# Patient Record
Sex: MALE | Race: WHITE | NOT HISPANIC OR LATINO | Employment: OTHER | URBAN - NONMETROPOLITAN AREA
[De-identification: names, ages, dates, MRNs, and addresses within clinical notes are randomized per-mention and may not be internally consistent; named-entity substitution may affect disease eponyms.]

---

## 2017-02-23 ENCOUNTER — OFFICE VISIT (OUTPATIENT)
Dept: FAMILY MEDICINE CLINIC | Facility: CLINIC | Age: 62
End: 2017-02-23

## 2017-02-23 VITALS
DIASTOLIC BLOOD PRESSURE: 68 MMHG | OXYGEN SATURATION: 96 % | WEIGHT: 223 LBS | HEIGHT: 73 IN | RESPIRATION RATE: 16 BRPM | BODY MASS INDEX: 29.55 KG/M2 | HEART RATE: 79 BPM | TEMPERATURE: 97.9 F | SYSTOLIC BLOOD PRESSURE: 117 MMHG

## 2017-02-23 DIAGNOSIS — J40 BRONCHITIS: Primary | ICD-10-CM

## 2017-02-23 DIAGNOSIS — L30.0 NUMMULAR ECZEMA: ICD-10-CM

## 2017-02-23 PROCEDURE — 99213 OFFICE O/P EST LOW 20 MIN: CPT | Performed by: INTERNAL MEDICINE

## 2017-02-23 RX ORDER — GUAIFENESIN 600 MG/1
1200 TABLET, EXTENDED RELEASE ORAL 2 TIMES DAILY
Qty: 60 TABLET | Refills: 1 | Status: SHIPPED | OUTPATIENT
Start: 2017-02-23 | End: 2017-06-09

## 2017-02-23 RX ORDER — DOXYCYCLINE 100 MG/1
100 CAPSULE ORAL 2 TIMES DAILY
Qty: 20 CAPSULE | Refills: 0 | Status: SHIPPED | OUTPATIENT
Start: 2017-02-23 | End: 2017-06-09

## 2017-02-23 RX ORDER — BETAMETHASONE DIPROPIONATE 0.5 MG/G
CREAM TOPICAL 2 TIMES DAILY
Qty: 45 G | Refills: 0 | Status: SHIPPED | OUTPATIENT
Start: 2017-02-23 | End: 2017-11-09

## 2017-02-23 NOTE — PROGRESS NOTES
"Subjective   Patient ID: Seng Seymour is a 61 y.o. male Pt is here for management of multiple medical problems.  History of Present Illness  Pt is here for management of multiple medical problems.    Uri started Tuesday and getting worse. Cough and sputum in the past days. Fever and chills. No soa.    No otc.  + sick contacts.      The following portions of the patient's history were reviewed and updated as appropriate: allergies, current medications, past family history, past medical history, past social history, past surgical history and problem list.      Review of Systems   Constitutional: Positive for fatigue and fever.   HENT: Positive for congestion and facial swelling. Negative for sore throat.    Respiratory: Positive for shortness of breath and wheezing.    Cardiovascular: Negative for chest pain.       Objective     Visit Vitals   • /68 (BP Location: Left arm, Patient Position: Sitting, Cuff Size: Large Adult)   • Pulse 79   • Temp 97.9 °F (36.6 °C) (Oral)   • Resp 16   • Ht 73\" (185.4 cm)   • Wt 223 lb (101 kg)   • SpO2 96%   • BMI 29.42 kg/m2     Physical Exam  General Appearance:    Alert, cooperative, no distress, appears stated age   Head:    Normocephalic, without obvious abnormality, atraumatic   Eyes:    PERRL, conjunctiva/corneas clear, EOM's intact           Ears:    Normal TM's and external ear canals, both ears   Nose:   Nares normal, septum midline, mucosa normal, no drainage    or sinus tenderness   Throat:   Lips, mucosa, and tongue normal; teeth and gums normal   Neck:   Supple, symmetrical, trachea midline, no adenopathy;        thyroid:  No enlargement/tenderness/nodules; no carotid    bruit or JVD   Back:     Symmetric, no curvature, ROM normal, no CVA tenderness   Lungs:     Decrease epitory bs with no wheezing   to auscultation bilaterally, respirations unlabored   Chest wall:    No tenderness or deformity   Heart:    Regular rate and rhythm, S1 and S2 normal, no murmur, " rub   or gallop   Abdomen:     Soft, non-tender, bowel sounds active all four quadrants,     no masses, no organomegaly           Extremities:   Extremities normal, atraumatic, no cyanosis or edema   Pulses:   2+ and symmetric all extremities   Skin:  left latteral leg rash    Lymph nodes:   Cervical, supraclavicular, and axillary nodes normal   Neurologic:   CNII-XII intact. Normal strength, sensation and reflexes       throughout       Assessment/Plan           Seng was seen today for cough, fever and nasal congestion.    Diagnoses and all orders for this visit:    Bronchitis    Nummular eczema  -     betamethasone dipropionate (DIPROLENE) 0.05 % cream; Apply  topically 2 (Two) Times a Day.    Other orders  -     doxycycline (MONODOX) 100 MG capsule; Take 1 capsule by mouth 2 (Two) Times a Day.  -     guaiFENesin (MUCINEX) 600 MG 12 hr tablet; Take 2 tablets by mouth 2 (Two) Times a Day.      No Follow-up on file.                   There are no Patient Instructions on file for this visit.

## 2017-06-05 ENCOUNTER — LAB (OUTPATIENT)
Dept: FAMILY MEDICINE CLINIC | Facility: CLINIC | Age: 62
End: 2017-06-05

## 2017-06-05 DIAGNOSIS — I10 ESSENTIAL HYPERTENSION: ICD-10-CM

## 2017-06-05 DIAGNOSIS — D72.828 OTHER ELEVATED WHITE BLOOD CELL (WBC) COUNT: ICD-10-CM

## 2017-06-05 LAB
ALBUMIN SERPL-MCNC: 4.3 G/DL (ref 3.5–5)
ALBUMIN/GLOB SERPL: 1.5 G/DL (ref 1–2)
ALP SERPL-CCNC: 93 U/L (ref 38–126)
ALT SERPL-CCNC: 47 U/L (ref 13–69)
AST SERPL-CCNC: 25 U/L (ref 15–46)
BASOPHILS # BLD AUTO: 0.05 10*3/MM3 (ref 0–0.2)
BASOPHILS NFR BLD AUTO: 0.5 % (ref 0–2.5)
BILIRUB SERPL-MCNC: 0.7 MG/DL (ref 0.2–1.3)
BUN SERPL-MCNC: 19 MG/DL (ref 7–20)
BUN/CREAT SERPL: 17.3 (ref 6.3–21.9)
CALCIUM SERPL-MCNC: 9.5 MG/DL (ref 8.4–10.2)
CHLORIDE SERPL-SCNC: 103 MMOL/L (ref 98–107)
CO2 SERPL-SCNC: 25 MMOL/L (ref 26–30)
CREAT SERPL-MCNC: 1.1 MG/DL (ref 0.6–1.3)
EOSINOPHIL # BLD AUTO: 0.13 10*3/MM3 (ref 0–0.7)
EOSINOPHIL NFR BLD AUTO: 1.3 % (ref 0–7)
ERYTHROCYTE [DISTWIDTH] IN BLOOD BY AUTOMATED COUNT: 13.2 % (ref 11.5–14.5)
GLOBULIN SER CALC-MCNC: 2.9 GM/DL
GLUCOSE SERPL-MCNC: 109 MG/DL (ref 74–98)
HCT VFR BLD AUTO: 47.4 % (ref 42–52)
HGB BLD-MCNC: 16 G/DL (ref 14–18)
IMM GRANULOCYTES # BLD: 0.03 10*3/MM3 (ref 0–0.06)
IMM GRANULOCYTES NFR BLD: 0.3 % (ref 0–0.6)
LYMPHOCYTES # BLD AUTO: 1.89 10*3/MM3 (ref 0.6–3.4)
LYMPHOCYTES NFR BLD AUTO: 18.5 % (ref 10–50)
MCH RBC QN AUTO: 29.1 PG (ref 27–31)
MCHC RBC AUTO-ENTMCNC: 33.8 G/DL (ref 30–37)
MCV RBC AUTO: 86.3 FL (ref 80–94)
MONOCYTES # BLD AUTO: 0.7 10*3/MM3 (ref 0–0.9)
MONOCYTES NFR BLD AUTO: 6.8 % (ref 0–12)
NEUTROPHILS # BLD AUTO: 7.42 10*3/MM3 (ref 2–6.9)
NEUTROPHILS NFR BLD AUTO: 72.6 % (ref 37–80)
NRBC BLD AUTO-RTO: 0 /100 WBC (ref 0–0)
PLATELET # BLD AUTO: 291 10*3/MM3 (ref 130–400)
POTASSIUM SERPL-SCNC: 4.2 MMOL/L (ref 3.5–5.1)
PROT SERPL-MCNC: 7.2 G/DL (ref 6.3–8.2)
RBC # BLD AUTO: 5.49 10*6/MM3 (ref 4.7–6.1)
SODIUM SERPL-SCNC: 142 MMOL/L (ref 137–145)
WBC # BLD AUTO: 10.22 10*3/MM3 (ref 4.8–10.8)

## 2017-06-09 ENCOUNTER — OFFICE VISIT (OUTPATIENT)
Dept: FAMILY MEDICINE CLINIC | Facility: CLINIC | Age: 62
End: 2017-06-09

## 2017-06-09 VITALS
RESPIRATION RATE: 16 BRPM | HEIGHT: 73 IN | SYSTOLIC BLOOD PRESSURE: 117 MMHG | TEMPERATURE: 97.7 F | HEART RATE: 62 BPM | DIASTOLIC BLOOD PRESSURE: 72 MMHG | OXYGEN SATURATION: 97 % | BODY MASS INDEX: 29.95 KG/M2 | WEIGHT: 226 LBS

## 2017-06-09 DIAGNOSIS — G47.09 OTHER INSOMNIA: Primary | ICD-10-CM

## 2017-06-09 DIAGNOSIS — I10 ESSENTIAL HYPERTENSION: ICD-10-CM

## 2017-06-09 PROCEDURE — 99214 OFFICE O/P EST MOD 30 MIN: CPT | Performed by: INTERNAL MEDICINE

## 2017-06-09 RX ORDER — LISINOPRIL 10 MG/1
10 TABLET ORAL DAILY
Qty: 30 TABLET | Refills: 11 | Status: SHIPPED | OUTPATIENT
Start: 2017-06-09 | End: 2018-05-11 | Stop reason: SDUPTHER

## 2017-06-09 RX ORDER — TRAZODONE HYDROCHLORIDE 50 MG/1
50 TABLET ORAL NIGHTLY
Qty: 30 TABLET | Refills: 11 | Status: SHIPPED | OUTPATIENT
Start: 2017-06-09 | End: 2018-12-26 | Stop reason: SDUPTHER

## 2017-06-09 NOTE — PROGRESS NOTES
"Subjective   Patient ID: Seng Seymour is a 62 y.o. male Pt is here for management of multiple medical problems.    Chief Complaint   Patient presents with   • Hypertension     6 month follow-up       History of Present Illness  Feels well.     Tolerating meds well.         The following portions of the patient's history were reviewed and updated as appropriate: allergies, current medications, past family history, past medical history, past social history, past surgical history and problem list.      Review of Systems   Constitutional: Negative for fatigue.   All other systems reviewed and are negative.      Objective     /72 (BP Location: Left arm, Patient Position: Sitting, Cuff Size: Large Adult)  Pulse 62  Temp 97.7 °F (36.5 °C) (Oral)   Resp 16  Ht 73\" (185.4 cm)  Wt 226 lb (103 kg)  SpO2 97%  BMI 29.82 kg/m2  Physical Exam  General Appearance:    Alert, cooperative, no distress, appears stated age   Head:    Normocephalic, without obvious abnormality, atraumatic   Eyes:    PERRL, conjunctiva/corneas clear, EOM's intact           Ears:    Normal TM's and external ear canals, both ears   Nose:   Nares normal, septum midline, mucosa normal, no drainage    or sinus tenderness   Throat:   Lips, mucosa, and tongue normal; teeth and gums normal   Neck:   Supple, symmetrical, trachea midline, no adenopathy;        thyroid:  No enlargement/tenderness/nodules; no carotid    bruit or JVD   Back:     Symmetric, no curvature, ROM normal, no CVA tenderness   Lungs:     Clear to auscultation bilaterally, respirations unlabored   Chest wall:    No tenderness or deformity   Heart:    Regular rate and rhythm, S1 and S2 normal, no murmur, rub   or gallop   Abdomen:     Soft, non-tender, bowel sounds active all four quadrants,     no masses, no organomegaly           Extremities:   Extremities normal, atraumatic, no cyanosis or edema   Pulses:   2+ and symmetric all extremities   Skin:   Skin color, texture, " turgor normal, no rashes or lesions   Lymph nodes:   Cervical, supraclavicular, and axillary nodes normal   Neurologic:   CNII-XII intact. Normal strength, sensation and reflexes       throughout       Assessment/Plan   Labs discussed.      decrease bp meds. Dc hctz.      Seng was seen today for hypertension.    Diagnoses and all orders for this visit:    Other insomnia  -     traZODone (DESYREL) 50 MG tablet; Take 1 tablet by mouth Every Night.  -     Lipid Panel  -     Comprehensive Metabolic Panel  -     Vitamin B12  -     T4, Free  -     TSH    Essential hypertension  -     Lipid Panel  -     Comprehensive Metabolic Panel  -     Vitamin B12  -     T4, Free  -     TSH    Other orders  -     lisinopril (PRINIVIL,ZESTRIL) 10 MG tablet; Take 1 tablet by mouth Daily.      Return in about 5 months (around 11/9/2017).                   There are no Patient Instructions on file for this visit.

## 2017-06-20 DIAGNOSIS — E78.00 HYPERCHOLESTEROLEMIA: ICD-10-CM

## 2017-06-20 RX ORDER — ATORVASTATIN CALCIUM 10 MG/1
TABLET, FILM COATED ORAL
Qty: 30 TABLET | Refills: 11 | Status: SHIPPED | OUTPATIENT
Start: 2017-06-20 | End: 2018-05-11 | Stop reason: SDUPTHER

## 2017-06-27 DIAGNOSIS — E78.00 HYPERCHOLESTEROLEMIA: ICD-10-CM

## 2017-06-27 RX ORDER — ATORVASTATIN CALCIUM 10 MG/1
TABLET, FILM COATED ORAL
Qty: 30 TABLET | Refills: 11 | Status: SHIPPED | OUTPATIENT
Start: 2017-06-27 | End: 2017-11-09

## 2017-07-27 RX ORDER — ACETAMINOPHEN/DIPHENHYDRAMINE 500MG-25MG
TABLET ORAL
Qty: 90 TABLET | Refills: 3 | Status: SHIPPED | OUTPATIENT
Start: 2017-07-27 | End: 2018-04-30 | Stop reason: SDUPTHER

## 2017-08-28 RX ORDER — AMLODIPINE BESYLATE 10 MG/1
TABLET ORAL
Qty: 30 TABLET | Refills: 11 | Status: SHIPPED | OUTPATIENT
Start: 2017-08-28 | End: 2018-05-11 | Stop reason: SDUPTHER

## 2017-11-06 LAB
ALBUMIN SERPL-MCNC: 4.3 G/DL (ref 3.5–5)
ALBUMIN/GLOB SERPL: 1.5 G/DL (ref 1–2)
ALP SERPL-CCNC: 104 U/L (ref 38–126)
ALT SERPL-CCNC: 41 U/L (ref 13–69)
AST SERPL-CCNC: 22 U/L (ref 15–46)
BILIRUB SERPL-MCNC: 0.8 MG/DL (ref 0.2–1.3)
BUN SERPL-MCNC: 15 MG/DL (ref 7–20)
BUN/CREAT SERPL: 13.6 (ref 6.3–21.9)
CALCIUM SERPL-MCNC: 9.4 MG/DL (ref 8.4–10.2)
CHLORIDE SERPL-SCNC: 105 MMOL/L (ref 98–107)
CHOLEST SERPL-MCNC: 128 MG/DL (ref 0–199)
CO2 SERPL-SCNC: 23 MMOL/L (ref 26–30)
CREAT SERPL-MCNC: 1.1 MG/DL (ref 0.6–1.3)
GFR SERPLBLD CREATININE-BSD FMLA CKD-EPI: 68 ML/MIN/1.73
GFR SERPLBLD CREATININE-BSD FMLA CKD-EPI: 82 ML/MIN/1.73
GLOBULIN SER CALC-MCNC: 2.8 GM/DL
GLUCOSE SERPL-MCNC: 101 MG/DL (ref 74–98)
HDLC SERPL-MCNC: 42 MG/DL (ref 40–60)
LDLC SERPL CALC-MCNC: 69 MG/DL (ref 0–99)
POTASSIUM SERPL-SCNC: 4.7 MMOL/L (ref 3.5–5.1)
PROT SERPL-MCNC: 7.1 G/DL (ref 6.3–8.2)
SODIUM SERPL-SCNC: 142 MMOL/L (ref 137–145)
T4 FREE SERPL-MCNC: 1.21 NG/DL (ref 0.78–2.19)
TRIGL SERPL-MCNC: 85 MG/DL
TSH SERPL DL<=0.005 MIU/L-ACNC: 3.05 MIU/ML (ref 0.47–4.68)
VIT B12 SERPL-MCNC: 563 PG/ML (ref 239–931)
VLDLC SERPL CALC-MCNC: 17 MG/DL

## 2017-11-09 ENCOUNTER — OFFICE VISIT (OUTPATIENT)
Dept: FAMILY MEDICINE CLINIC | Facility: CLINIC | Age: 62
End: 2017-11-09

## 2017-11-09 VITALS
TEMPERATURE: 98 F | DIASTOLIC BLOOD PRESSURE: 75 MMHG | RESPIRATION RATE: 16 BRPM | BODY MASS INDEX: 29.42 KG/M2 | HEART RATE: 75 BPM | WEIGHT: 222 LBS | SYSTOLIC BLOOD PRESSURE: 124 MMHG | OXYGEN SATURATION: 98 % | HEIGHT: 73 IN

## 2017-11-09 DIAGNOSIS — I10 ESSENTIAL HYPERTENSION: ICD-10-CM

## 2017-11-09 DIAGNOSIS — E78.00 HYPERCHOLESTEROLEMIA: ICD-10-CM

## 2017-11-09 DIAGNOSIS — Z12.5 SCREENING FOR PROSTATE CANCER: Primary | ICD-10-CM

## 2017-11-09 PROCEDURE — 99213 OFFICE O/P EST LOW 20 MIN: CPT | Performed by: INTERNAL MEDICINE
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
## 2017-12-01 NOTE — TELEPHONE ENCOUNTER
Patient  Pharmacy request finasteride 5mg refill on the patient's behalf, this has been refused because patient has not been seen in over a year and does not have an appointment. Left message for patient to call for an appointment.

## 2018-01-19 ENCOUNTER — OFFICE VISIT (OUTPATIENT)
Dept: INTERNAL MEDICINE | Facility: CLINIC | Age: 63
End: 2018-01-19

## 2018-01-19 VITALS
BODY MASS INDEX: 28.49 KG/M2 | SYSTOLIC BLOOD PRESSURE: 113 MMHG | HEIGHT: 73 IN | OXYGEN SATURATION: 97 % | HEART RATE: 77 BPM | DIASTOLIC BLOOD PRESSURE: 69 MMHG | TEMPERATURE: 97.9 F | RESPIRATION RATE: 16 BRPM | WEIGHT: 215 LBS

## 2018-01-19 DIAGNOSIS — R11.2 NON-INTRACTABLE VOMITING WITH NAUSEA, UNSPECIFIED VOMITING TYPE: Primary | ICD-10-CM

## 2018-01-19 DIAGNOSIS — R19.7 BLOODY DIARRHEA: ICD-10-CM

## 2018-01-19 LAB
EXPIRATION DATE: NORMAL
FLUAV AG NPH QL: NEGATIVE
FLUBV AG NPH QL: NEGATIVE
INTERNAL CONTROL: NORMAL
Lab: NORMAL

## 2018-01-19 PROCEDURE — 99214 OFFICE O/P EST MOD 30 MIN: CPT | Performed by: INTERNAL MEDICINE

## 2018-01-19 PROCEDURE — 87804 INFLUENZA ASSAY W/OPTIC: CPT | Performed by: INTERNAL MEDICINE

## 2018-01-19 RX ORDER — ONDANSETRON 8 MG/1
8 TABLET, ORALLY DISINTEGRATING ORAL EVERY 8 HOURS PRN
Qty: 10 TABLET | Refills: 0 | Status: SHIPPED | OUTPATIENT
Start: 2018-01-19 | End: 2018-05-11

## 2018-01-19 NOTE — PROGRESS NOTES
Subjective     Patient ID: Seng Seymour is a 62 y.o. male. Patient is here for management of multiple medical problems.     Chief Complaint   Patient presents with   • Diarrhea     patient having nausea, vomiting, diarrhea, and chills, after eating at [employer] yesterday, patient began having blood in stools during the night     History of Present Illness     Ate at [facility] [employer] bar and got sick shortly after.  N/v/d all started yesterday 6pm as pt was leaving the resturant. Now with blood bight red  All blood. No stool. No large amount of blood.  No further BM's since this am.      The following portions of the patient's history were reviewed and updated as appropriate: allergies, current medications, past family history, past medical history, past social history, past surgical history and problem list.    Review of Systems   Constitutional: Positive for fatigue.   Gastrointestinal: Positive for abdominal distention, abdominal pain and anal bleeding. Negative for constipation and diarrhea.   All other systems reviewed and are negative.      Current Outpatient Prescriptions:   •  amLODIPine (NORVASC) 10 MG tablet, take 1 tablet by mouth once daily, Disp: 30 tablet, Rfl: 11  •  atorvastatin (LIPITOR) 10 MG tablet, take 1 tablet by mouth once daily, Disp: 30 tablet, Rfl: 11  •  Cyanocobalamin 2500 MCG sublingual tablet, Place 1 tablet under the tongue daily., Disp: 90 each, Rfl: 3  •  finasteride (PROSCAR) 5 MG tablet, take 1 tablet by mouth once daily, Disp: 30 tablet, Rfl: 11  •  lisinopril (PRINIVIL,ZESTRIL) 10 MG tablet, Take 1 tablet by mouth Daily., Disp: 30 tablet, Rfl: 11  •  naproxen (NAPROSYN) 500 MG tablet, take 1 tablet by mouth every 12 hours if needed, Disp: 60 tablet, Rfl: 5  •  RA ASPIRIN EC ADULT LOW ST 81 MG EC tablet, take 1 tablet by mouth once daily, Disp: 90 tablet, Rfl: 3  •  traZODone (DESYREL) 50 MG tablet, Take 1 tablet by mouth Every Night., Disp: 30 tablet, Rfl: 11  •   "ondansetron ODT (ZOFRAN ODT) 8 MG disintegrating tablet, Take 1 tablet by mouth Every 8 (Eight) Hours As Needed for Nausea or Vomiting., Disp: 10 tablet, Rfl: 0    Objective      Blood pressure 113/69, pulse 77, temperature 97.9 °F (36.6 °C), temperature source Oral, resp. rate 16, height 185.4 cm (73\"), weight 97.5 kg (215 lb), SpO2 97 %.    Physical Exam     General Appearance:    Alert, cooperative, no distress, appears stated age   Head:    Normocephalic, without obvious abnormality, atraumatic   Eyes:    PERRL, conjunctiva/corneas clear, EOM's intact   Ears:    Normal TM's and external ear canals, both ears   Nose:   Nares normal, septum midline, mucosa normal, no drainage   or sinus tenderness   Throat:   Lips, mucosa, and tongue normal; teeth and gums normal   Neck:   Supple, symmetrical, trachea midline, no adenopathy;        thyroid:  No enlargement/tenderness/nodules; no carotid    bruit or JVD   Back:     Symmetric, no curvature, ROM normal, no CVA tenderness   Lungs:     Clear to auscultation bilaterally, respirations unlabored   Chest wall:    No tenderness or deformity   Heart:    Regular rate and rhythm, S1 and S2 normal, no murmur,        rub or gallop   Abdomen:     Soft, tender lower left an dsuprpubic ab.  No rebound neg heal tap.   bowel sounds active all four quadrants,     no masses, no organomegaly   Extremities:   Extremities normal, atraumatic, no cyanosis or edema   Pulses:   2+ and symmetric all extremities   Skin:   Skin color, texture, turgor normal, no rashes or lesions   Lymph nodes:   Cervical, supraclavicular, and axillary nodes normal   Neurologic:   CNII-XII intact. Normal strength, sensation and reflexes       throughout      Results for orders placed or performed in visit on 01/19/18   POCT Influenza A/B   Result Value Ref Range    Rapid Influenza A Ag Negative     Rapid Influenza B Ag Negative     Internal Control Passed Passed    Lot Number [id number]     Expiration Date " 05/14/2020          Assessment/Plan       Seng was seen today for diarrhea.    Diagnoses and all orders for this visit:    Non-intractable vomiting with nausea, unspecified vomiting type  -     Gastrointestinal Panel, PCR - Stool, Per Rectum  -     POCT Influenza A/B    Bloody diarrhea  -     Gastrointestinal Panel, PCR - Stool, Per Rectum    Other orders  -     ondansetron ODT (ZOFRAN ODT) 8 MG disintegrating tablet; Take 1 tablet by mouth Every 8 (Eight) Hours As Needed for Nausea or Vomiting.      Return if symptoms worsen or fail to improve.          There are no Patient Instructions on file for this visit.     Kei Jarrett MD    Assessment/Plan

## 2018-02-27 PROBLEM — R58 INTERNAL HEMORRHAGE: Status: ACTIVE | Noted: 2018-02-27

## 2018-02-27 PROBLEM — K57.30 DIVERTICULOSIS OF LARGE INTESTINE: Status: ACTIVE | Noted: 2018-02-27

## 2018-04-29 RX ORDER — ACETAMINOPHEN/DIPHENHYDRAMINE 500MG-25MG
TABLET ORAL
Qty: 90 TABLET | Refills: 3 | Status: CANCELLED | OUTPATIENT
Start: 2018-04-29

## 2018-04-30 RX ORDER — ASPIRIN 81 MG/1
81 TABLET ORAL DAILY
Qty: 90 TABLET | Refills: 3 | Status: SHIPPED | OUTPATIENT
Start: 2018-04-30 | End: 2019-05-06 | Stop reason: SDUPTHER

## 2018-05-11 ENCOUNTER — OFFICE VISIT (OUTPATIENT)
Dept: INTERNAL MEDICINE | Facility: CLINIC | Age: 63
End: 2018-05-11

## 2018-05-11 VITALS
OXYGEN SATURATION: 97 % | SYSTOLIC BLOOD PRESSURE: 121 MMHG | WEIGHT: 216 LBS | HEIGHT: 73 IN | TEMPERATURE: 97.7 F | BODY MASS INDEX: 28.63 KG/M2 | HEART RATE: 63 BPM | DIASTOLIC BLOOD PRESSURE: 74 MMHG | RESPIRATION RATE: 16 BRPM

## 2018-05-11 DIAGNOSIS — I10 ESSENTIAL HYPERTENSION: ICD-10-CM

## 2018-05-11 DIAGNOSIS — Z00.00 ROUTINE GENERAL MEDICAL EXAMINATION AT A HEALTH CARE FACILITY: Primary | ICD-10-CM

## 2018-05-11 DIAGNOSIS — R97.20 PSA ELEVATION: ICD-10-CM

## 2018-05-11 DIAGNOSIS — E78.00 HYPERCHOLESTEROLEMIA: ICD-10-CM

## 2018-05-11 PROCEDURE — 99396 PREV VISIT EST AGE 40-64: CPT | Performed by: INTERNAL MEDICINE

## 2018-05-11 RX ORDER — LISINOPRIL 10 MG/1
10 TABLET ORAL DAILY
Qty: 90 TABLET | Refills: 3 | Status: SHIPPED | OUTPATIENT
Start: 2018-05-11 | End: 2019-05-13 | Stop reason: SDUPTHER

## 2018-05-11 RX ORDER — AMLODIPINE BESYLATE 10 MG/1
10 TABLET ORAL DAILY
Qty: 90 TABLET | Refills: 3 | Status: SHIPPED | OUTPATIENT
Start: 2018-05-11 | End: 2019-03-22 | Stop reason: SDUPTHER

## 2018-05-11 RX ORDER — ATORVASTATIN CALCIUM 10 MG/1
10 TABLET, FILM COATED ORAL DAILY
Qty: 90 TABLET | Refills: 3 | Status: SHIPPED | OUTPATIENT
Start: 2018-05-11 | End: 2019-05-06 | Stop reason: SDUPTHER

## 2018-05-11 NOTE — PROGRESS NOTES
Subjective     Patient ID: Seng Seymour is a 63 y.o. male. Patient is here for management of multiple medical problems.     Chief Complaint   Patient presents with   • Hypertension     6 month follow-up   • Hyperlipidemia     6 month follow-up   • medication refills     patient needs refills on Lisinopril, Atorvastatin, and Naproxen [facility]     Hypertension   This is a chronic problem. The problem is controlled. There are no associated agents to hypertension. Past treatments include ACE inhibitors and calcium channel blockers. Current antihypertension treatment includes ACE inhibitors and calcium channel blockers. The current treatment provides moderate improvement. There are no compliance problems.    Hyperlipidemia   This is a chronic problem. The problem is controlled. Recent lipid tests were reviewed and are normal. There are no known factors aggravating his hyperlipidemia. The current treatment provides significant improvement of lipids. There are no compliance problems.  There are no known risk factors for coronary artery disease.    pt due to yearly PE. That will be to day.        The following portions of the patient's history were reviewed and updated as appropriate: allergies, current medications, past family history, past medical history, past social history, past surgical history and problem list.    Review of Systems   Constitutional: Negative for fatigue.   Psychiatric/Behavioral: Negative for dysphoric mood.   All other systems reviewed and are negative.      Current Outpatient Prescriptions:   •  amLODIPine (NORVASC) 10 MG tablet, Take 1 tablet by mouth Daily., Disp: 90 tablet, Rfl: 3  •  aspirin (RA ASPIRIN EC ADULT LOW ST) 81 MG EC tablet, Take 1 tablet by mouth Daily., Disp: 90 tablet, Rfl: 3  •  atorvastatin (LIPITOR) 10 MG tablet, Take 1 tablet by mouth Daily., Disp: 90 tablet, Rfl: 3  •  Cyanocobalamin 2500 MCG sublingual tablet, Place 1 tablet under the tongue daily., Disp: 90  "each, Rfl: 3  •  lisinopril (PRINIVIL,ZESTRIL) 10 MG tablet, Take 1 tablet by mouth Daily., Disp: 90 tablet, Rfl: 3  •  naproxen (NAPROSYN) 500 MG tablet, take 1 tablet by mouth every 12 hours if needed, Disp: 60 tablet, Rfl: 5  •  traZODone (DESYREL) 50 MG tablet, Take 1 tablet by mouth Every Night., Disp: 30 tablet, Rfl: 11    Objective      Blood pressure 121/74, pulse 63, temperature 97.7 °F (36.5 °C), temperature source Oral, resp. rate 16, height 185.4 cm (73\"), weight 98 kg (216 lb), SpO2 97 %.    Physical Exam     General Appearance:    Alert, cooperative, no distress, appears stated age   Head:    Normocephalic, without obvious abnormality, atraumatic   Eyes:    PERRL, conjunctiva/corneas clear, EOM's intact   Ears:    Normal TM's and external ear canals, both ears   Nose:   Nares normal, septum midline, mucosa normal, no drainage   or sinus tenderness   Throat:   Lips, mucosa, and tongue normal; teeth and gums normal   Neck:   Supple, symmetrical, trachea midline, no adenopathy;        thyroid:  No enlargement/tenderness/nodules; no carotid    bruit or JVD   Back:     Symmetric, no curvature, ROM normal, no CVA tenderness   Lungs:     Clear to auscultation bilaterally, respirations unlabored   Chest wall:    No tenderness or deformity   Heart:    Regular rate and rhythm, S1 and S2 normal, no murmur,        rub or gallop   Abdomen:     Soft, non-tender, bowel sounds active all four quadrants,     no masses, no organomegaly   Extremities:   Extremities normal, atraumatic, no cyanosis or edema   Pulses:   2+ and symmetric all extremities   Skin:   Skin color, texture, turgor normal, no rashes or lesions   Lymph nodes:   Cervical, supraclavicular, and axillary nodes normal   Neurologic:   CNII-XII intact. Normal strength, sensation and reflexes       throughout      Results for orders placed or performed in visit on 01/19/18   POCT Influenza A/B   Result Value Ref Range    Rapid Influenza A Ag Negative     " Rapid Influenza B Ag Negative     Internal Control Passed Passed    Lot Number [id number]     Expiration Date 05/14/2020          Assessment/Plan   Pt off proscar and notice no worsening. Will stay off med.    rf another med.          Seng was seen today for hypertension, hyperlipidemia and medication refills.    Diagnoses and all orders for this visit:    Routine general medical examination at a health care facility    Hypercholesterolemia  -     atorvastatin (LIPITOR) 10 MG tablet; Take 1 tablet by mouth Daily.  -     TSH  -     T4, Free  -     Lipid Panel  -     Comprehensive Metabolic Panel  -     CBC & Differential    Essential hypertension  -     amLODIPine (NORVASC) 10 MG tablet; Take 1 tablet by mouth Daily.  -     lisinopril (PRINIVIL,ZESTRIL) 10 MG tablet; Take 1 tablet by mouth Daily.  -     TSH  -     T4, Free  -     Lipid Panel  -     Comprehensive Metabolic Panel  -     CBC & Differential    PSA elevation  -     PSA Screen    Diet low carb discussed.  Wear seat belts.  Vaccines up todate.    Return in about 6 months (around 11/11/2018).          There are no Patient Instructions on file for this visit.     Kei Jarrett MD    Assessment/Plan

## 2018-07-02 RX ORDER — NAPROXEN 500 MG/1
TABLET ORAL
Qty: 60 TABLET | Refills: 5 | Status: SHIPPED | OUTPATIENT
Start: 2018-07-02 | End: 2018-07-03 | Stop reason: SDUPTHER

## 2018-07-03 RX ORDER — NAPROXEN 500 MG/1
500 TABLET ORAL 2 TIMES DAILY WITH MEALS
Qty: 60 TABLET | Refills: 5 | Status: SHIPPED | OUTPATIENT
Start: 2018-07-03

## 2018-11-06 LAB
ALBUMIN SERPL-MCNC: 4.3 G/DL (ref 3.5–5)
ALBUMIN/GLOB SERPL: 1.4 G/DL (ref 1–2)
ALP SERPL-CCNC: 104 U/L (ref 38–126)
ALT SERPL-CCNC: 45 U/L (ref 13–69)
AST SERPL-CCNC: 27 U/L (ref 15–46)
BASOPHILS # BLD AUTO: 0.04 10*3/MM3 (ref 0–0.2)
BASOPHILS NFR BLD AUTO: 0.4 % (ref 0–2.5)
BILIRUB SERPL-MCNC: 1.1 MG/DL (ref 0.2–1.3)
BUN SERPL-MCNC: 15 MG/DL (ref 7–20)
BUN/CREAT SERPL: 15 (ref 6.3–21.9)
CALCIUM SERPL-MCNC: 9.4 MG/DL (ref 8.4–10.2)
CHLORIDE SERPL-SCNC: 104 MMOL/L (ref 98–107)
CHOLEST SERPL-MCNC: 130 MG/DL (ref 0–199)
CO2 SERPL-SCNC: 25 MMOL/L (ref 26–30)
CREAT SERPL-MCNC: 1 MG/DL (ref 0.6–1.3)
EOSINOPHIL # BLD AUTO: 0.09 10*3/MM3 (ref 0–0.7)
EOSINOPHIL NFR BLD AUTO: 0.9 % (ref 0–7)
ERYTHROCYTE [DISTWIDTH] IN BLOOD BY AUTOMATED COUNT: 13.5 % (ref 11.5–14.5)
GLOBULIN SER CALC-MCNC: 3 GM/DL
GLUCOSE SERPL-MCNC: 120 MG/DL (ref 74–98)
HCT VFR BLD AUTO: 47.7 % (ref 42–52)
HDLC SERPL-MCNC: 44 MG/DL (ref 40–60)
HGB BLD-MCNC: 15.9 G/DL (ref 14–18)
IMM GRANULOCYTES # BLD: 0.04 10*3/MM3 (ref 0–0.06)
IMM GRANULOCYTES NFR BLD: 0.4 % (ref 0–0.6)
LDLC SERPL CALC-MCNC: 69 MG/DL (ref 0–99)
LYMPHOCYTES # BLD AUTO: 1.36 10*3/MM3 (ref 0.6–3.4)
LYMPHOCYTES NFR BLD AUTO: 13.1 % (ref 10–50)
MCH RBC QN AUTO: 28.8 PG (ref 27–31)
MCHC RBC AUTO-ENTMCNC: 33.3 G/DL (ref 30–37)
MCV RBC AUTO: 86.3 FL (ref 80–94)
MONOCYTES # BLD AUTO: 0.51 10*3/MM3 (ref 0–0.9)
MONOCYTES NFR BLD AUTO: 4.9 % (ref 0–12)
NEUTROPHILS # BLD AUTO: 8.37 10*3/MM3 (ref 2–6.9)
NEUTROPHILS NFR BLD AUTO: 80.3 % (ref 37–80)
NRBC BLD AUTO-RTO: 0 /100 WBC (ref 0–0)
PLATELET # BLD AUTO: 285 10*3/MM3 (ref 130–400)
POTASSIUM SERPL-SCNC: 4.3 MMOL/L (ref 3.5–5.1)
PROT SERPL-MCNC: 7.3 G/DL (ref 6.3–8.2)
PSA SERPL-MCNC: 3.34 NG/ML (ref 0.06–4)
RBC # BLD AUTO: 5.53 10*6/MM3 (ref 4.7–6.1)
SODIUM SERPL-SCNC: 139 MMOL/L (ref 137–145)
T4 FREE SERPL-MCNC: 1.04 NG/DL (ref 0.78–2.19)
TRIGL SERPL-MCNC: 85 MG/DL
TSH SERPL DL<=0.005 MIU/L-ACNC: 2.16 MIU/ML (ref 0.47–4.68)
VLDLC SERPL CALC-MCNC: 17 MG/DL
WBC # BLD AUTO: 10.41 10*3/MM3 (ref 4.8–10.8)

## 2018-11-12 ENCOUNTER — OFFICE VISIT (OUTPATIENT)
Dept: INTERNAL MEDICINE | Facility: CLINIC | Age: 63
End: 2018-11-12

## 2018-11-12 VITALS
BODY MASS INDEX: 28.63 KG/M2 | TEMPERATURE: 98 F | SYSTOLIC BLOOD PRESSURE: 120 MMHG | RESPIRATION RATE: 16 BRPM | HEART RATE: 59 BPM | DIASTOLIC BLOOD PRESSURE: 70 MMHG | WEIGHT: 216 LBS | HEIGHT: 73 IN | OXYGEN SATURATION: 98 %

## 2018-11-12 DIAGNOSIS — R97.20 PSA ELEVATION: ICD-10-CM

## 2018-11-12 DIAGNOSIS — E78.00 HYPERCHOLESTEROLEMIA: Primary | ICD-10-CM

## 2018-11-12 DIAGNOSIS — I10 ESSENTIAL HYPERTENSION: ICD-10-CM

## 2018-11-12 PROCEDURE — 99214 OFFICE O/P EST MOD 30 MIN: CPT | Performed by: INTERNAL MEDICINE

## 2018-11-12 NOTE — PROGRESS NOTES
Subjective     Patient ID: Seng Seymour is a 63 y.o. male. Patient is here for management of multiple medical problems.     Chief Complaint   Patient presents with   • Hypertension     6 month follow-up   • Medication refills     patient needs refills on Traz[facility]one sent to [email or web address]     Hypertension   This is a chronic problem. The current episode started more than 1 year ago. The problem is unchanged. The problem is controlled. Associated symptoms include blurred vision. Pertinent negatives include no chest pain or headaches. There are no associated agents to hypertension. There are no known risk factors for coronary artery disease. Current antihypertension treatment includes ACE inhibitors and calcium channel blockers. The current treatment provides significant improvement. There are no compliance problems.  There is no history of angina, kidney disease, CAD/MI, CVA, heart failure or left ventricular hypertrophy.   Hyperlipidemia   This is a chronic problem. The problem is controlled. Recent lipid tests were reviewed and are variable. Pertinent negatives include no chest pain. Current antihyperlipidemic treatment includes statins. The current treatment provides significant improvement of lipids. Risk factors for coronary artery disease include a sedentary lifestyle, hypertension and diabetes mellitus.        Pt off finasteride for a while.   Doing well with out med.  No decrease in stream.      The following portions of the patient's history were reviewed and updated as appropriate: allergies, current medications, past family history, past medical history, past social history, past surgical history and problem list.    Review of Systems   Constitutional: Negative for fatigue.   Eyes: Positive for blurred vision.   Cardiovascular: Negative for chest pain.   Neurological: Negative for headaches.   Psychiatric/Behavioral: Negative for sleep disturbance.   All other systems reviewed and are negative.      Current  "Outpatient Medications:   •  amLODIPine (NORVASC) 10 MG tablet, Take 1 tablet by mouth Daily., Disp: 90 tablet, Rfl: 3  •  aspirin (RA ASPIRIN EC ADULT LOW ST) 81 MG EC tablet, Take 1 tablet by mouth Daily., Disp: 90 tablet, Rfl: 3  •  atorvastatin (LIPITOR) 10 MG tablet, Take 1 tablet by mouth Daily., Disp: 90 tablet, Rfl: 3  •  Cyanocobalamin 2500 MCG sublingual tablet, Place 1 tablet under the tongue daily., Disp: 90 each, Rfl: 3  •  lisinopril (PRINIVIL,ZESTRIL) 10 MG tablet, Take 1 tablet by mouth Daily., Disp: 90 tablet, Rfl: 3  •  naproxen (NAPROSYN) 500 MG tablet, Take 1 tablet by mouth 2 (Two) Times a Day With Meals., Disp: 60 tablet, Rfl: 5  •  traZODone (DESYREL) 50 MG tablet, Take 1 tablet by mouth Every Night., Disp: 30 tablet, Rfl: 11    Objective      Blood pressure 120/70, pulse 59, temperature 98 °F (36.7 °C), resp. rate 16, height 185.4 cm (73\"), weight 98 kg (216 lb), SpO2 98 %.    Physical Exam     General Appearance:    Alert, cooperative, no distress, appears stated age   Head:    Normocephalic, without obvious abnormality, atraumatic   Eyes:    PERRL, conjunctiva/corneas clear, EOM's intact   Ears:    Normal TM's and external ear canals, both ears   Nose:   Nares normal, septum midline, mucosa normal, no drainage   or sinus tenderness   Throat:   Lips, mucosa, and tongue normal; teeth and gums normal   Neck:   Supple, symmetrical, trachea midline, no adenopathy;        thyroid:  No enlargement/tenderness/nodules; no carotid    bruit or JVD   Back:     Symmetric, no curvature, ROM normal, no CVA tenderness   Lungs:     Clear to auscultation bilaterally, respirations unlabored   Chest wall:    No tenderness or deformity   Heart:    Regular rate and rhythm, S1 and S2 normal, no murmur,        rub or gallop   Abdomen:     Soft, non-tender, bowel sounds active all four quadrants,     no masses, no organomegaly   Extremities:   Extremities normal, atraumatic, no cyanosis or edema   Pulses:   2+ and " symmetric all extremities   Skin:   Skin color, texture, turgor normal, no rashes or lesions   Lymph nodes:   Cervical, supraclavicular, and axillary nodes normal   Neurologic:   CNII-XII intact. Normal strength, sensation and reflexes       throughout      Results for orders placed or performed in visit on 05/11/18   TSH   Result Value Ref Range    TSH 2.160 0.470 - 4.680 mIU/mL   T4, Free   Result Value Ref Range    Free T4 1.04 0.78 - 2.19 ng/dL   Lipid Panel   Result Value Ref Range    Total Cholesterol 130 0 - 199 mg/dL    Triglycerides 85 <150 mg/dL    HDL Cholesterol 44 40 - 60 mg/dL    VLDL Cholesterol 17 mg/dL    LDL Cholesterol  69 0 - 99 mg/dL   Comprehensive Metabolic Panel   Result Value Ref Range    Glucose 120 (H) 74 - 98 mg/dL    BUN 15 7 - 20 mg/dL    Creatinine 1.00 0.60 - 1.30 mg/dL    eGFR Non African Am 75 >60 mL/min/1.73    eGFR African Am 91 >60 mL/min/1.73    BUN/Creatinine Ratio 15.0 6.3 - 21.9    Sodium 139 137 - 145 mmol/L    Potassium 4.3 3.5 - 5.1 mmol/L    Chloride 104 98 - 107 mmol/L    Total CO2 25.0 (L) 26.0 - 30.0 mmol/L    Calcium 9.4 8.4 - 10.2 mg/dL    Total Protein 7.3 6.3 - 8.2 g/dL    Albumin 4.30 3.50 - 5.00 g/dL    Globulin 3.0 gm/dL    A/G Ratio 1.4 1.0 - 2.0 g/dL    Total Bilirubin 1.1 0.2 - 1.3 mg/dL    Alkaline Phosphatase 104 38 - 126 U/L    AST (SGOT) 27 15 - 46 U/L    ALT (SGPT) 45 13 - 69 U/L   PSA Screen   Result Value Ref Range    PSA 3.340 0.060 - 4.000 ng/mL   CBC & Differential   Result Value Ref Range    WBC 10.41 4.80 - 10.80 10*3/mm3    RBC 5.53 4.70 - 6.10 10*6/mm3    Hemoglobin 15.9 14.0 - 18.0 g/dL    Hematocrit 47.7 42.0 - 52.0 %    MCV 86.3 80.0 - 94.0 fL    MCH 28.8 27.0 - 31.0 pg    MCHC 33.3 30.0 - 37.0 g/dL    RDW 13.5 11.5 - 14.5 %    Platelets 285 130 - 400 10*3/mm3    Neutrophil Rel % 80.3 (H) 37.0 - 80.0 %    Lymphocyte Rel % 13.1 10.0 - 50.0 %    Monocyte Rel % 4.9 0.0 - 12.0 %    Eosinophil Rel % 0.9 0.0 - 7.0 %    Basophil Rel % 0.4 0.0 - 2.5  %    Neutrophils Absolute 8.37 (H) 2.00 - 6.90 10*3/mm3    Lymphocytes Absolute 1.36 0.60 - 3.40 10*3/mm3    Monocytes Absolute 0.51 0.00 - 0.90 10*3/mm3    Eosinophils Absolute 0.09 0.00 - 0.70 10*3/mm3    Basophils Absolute 0.04 0.00 - 0.20 10*3/mm3    Immature Granulocyte Rel % 0.4 0.0 - 0.6 %    Immature Grans Absolute 0.04 0.00 - 0.06 10*3/mm3    nRBC 0.0 0.0 - 0.0 /100 WBC         Assessment/Plan   velocity increase in psa as pt came of Proscar.  Will recheck in 6 month  Pt non fasting bs. Will reeval on rtc.      Seng was seen today for hypertension and medication refills.    Diagnoses and all orders for this visit:    Hypercholesterolemia  -     Comprehensive Metabolic Panel    Essential hypertension  -     Comprehensive Metabolic Panel    PSA elevation  -     PSA Screen      Return in about 6 months (around 5/12/2019).          There are no Patient Instructions on file for this visit.     Kei Jarrett MD    Assessment/Plan

## 2018-12-26 DIAGNOSIS — G47.09 OTHER INSOMNIA: ICD-10-CM

## 2018-12-26 RX ORDER — TRAZODONE HYDROCHLORIDE 50 MG/1
50 TABLET ORAL NIGHTLY
Qty: 30 TABLET | Refills: 4 | Status: SHIPPED | OUTPATIENT
Start: 2018-12-26 | End: 2019-05-13 | Stop reason: SDUPTHER

## 2019-03-22 DIAGNOSIS — I10 ESSENTIAL HYPERTENSION: ICD-10-CM

## 2019-03-22 RX ORDER — AMLODIPINE BESYLATE 10 MG/1
TABLET ORAL
Qty: 90 TABLET | Refills: 3 | Status: SHIPPED | OUTPATIENT
Start: 2019-03-22 | End: 2020-05-14

## 2019-05-06 DIAGNOSIS — E78.00 HYPERCHOLESTEROLEMIA: ICD-10-CM

## 2019-05-06 LAB
ALBUMIN SERPL-MCNC: 3.9 G/DL (ref 3.5–5)
ALBUMIN/GLOB SERPL: 1.4 G/DL (ref 1–2)
ALP SERPL-CCNC: 101 U/L (ref 38–126)
ALT SERPL-CCNC: 41 U/L (ref 13–69)
AST SERPL-CCNC: 25 U/L (ref 15–46)
BILIRUB SERPL-MCNC: 1 MG/DL (ref 0.2–1.3)
BUN SERPL-MCNC: 15 MG/DL (ref 7–20)
BUN/CREAT SERPL: 16.7 (ref 6.3–21.9)
CALCIUM SERPL-MCNC: 8.9 MG/DL (ref 8.4–10.2)
CHLORIDE SERPL-SCNC: 104 MMOL/L (ref 98–107)
CO2 SERPL-SCNC: 27 MMOL/L (ref 26–30)
CREAT SERPL-MCNC: 0.9 MG/DL (ref 0.6–1.3)
GLOBULIN SER CALC-MCNC: 2.8 GM/DL
GLUCOSE SERPL-MCNC: 102 MG/DL (ref 74–98)
POTASSIUM SERPL-SCNC: 4.5 MMOL/L (ref 3.5–5.1)
PROT SERPL-MCNC: 6.7 G/DL (ref 6.3–8.2)
PSA SERPL-MCNC: 3.26 NG/ML (ref 0.06–4)
SODIUM SERPL-SCNC: 140 MMOL/L (ref 137–145)

## 2019-05-07 RX ORDER — ATORVASTATIN CALCIUM 10 MG/1
TABLET, FILM COATED ORAL
Qty: 90 TABLET | Refills: 3 | Status: SHIPPED | OUTPATIENT
Start: 2019-05-07 | End: 2020-07-06

## 2019-05-07 RX ORDER — ACETAMINOPHEN/DIPHENHYDRAMINE 500MG-25MG
TABLET ORAL
Qty: 90 TABLET | Refills: 3 | Status: SHIPPED | OUTPATIENT
Start: 2019-05-07 | End: 2020-05-18

## 2019-05-13 ENCOUNTER — OFFICE VISIT (OUTPATIENT)
Dept: INTERNAL MEDICINE | Facility: CLINIC | Age: 64
End: 2019-05-13

## 2019-05-13 VITALS
RESPIRATION RATE: 16 BRPM | WEIGHT: 204 LBS | HEART RATE: 59 BPM | HEIGHT: 73 IN | DIASTOLIC BLOOD PRESSURE: 68 MMHG | SYSTOLIC BLOOD PRESSURE: 120 MMHG | OXYGEN SATURATION: 97 % | TEMPERATURE: 98.1 F | BODY MASS INDEX: 27.04 KG/M2

## 2019-05-13 DIAGNOSIS — G47.09 OTHER INSOMNIA: ICD-10-CM

## 2019-05-13 DIAGNOSIS — I10 ESSENTIAL HYPERTENSION: ICD-10-CM

## 2019-05-13 PROCEDURE — 99396 PREV VISIT EST AGE 40-64: CPT | Performed by: INTERNAL MEDICINE

## 2019-05-13 RX ORDER — TRAZODONE HYDROCHLORIDE 50 MG/1
50 TABLET ORAL NIGHTLY
Qty: 30 TABLET | Refills: 4 | Status: SHIPPED | OUTPATIENT
Start: 2019-05-13 | End: 2019-11-14 | Stop reason: SDUPTHER

## 2019-05-13 RX ORDER — LISINOPRIL 10 MG/1
10 TABLET ORAL DAILY
Qty: 90 TABLET | Refills: 3 | Status: SHIPPED | OUTPATIENT
Start: 2019-05-13 | End: 2020-07-06

## 2019-05-13 NOTE — PROGRESS NOTES
Subjective     Patient ID: Seng Seymour is a 64 y.o. male. Patient is here for management of multiple medical problems.     Chief Complaint   Patient presents with   • Hyperlipidemia     6 month follow-up     Hyperlipidemia   This is a chronic problem. The problem is controlled. Recent lipid tests were reviewed and are normal. He has no history of chronic renal disease or diabetes. There are no known factors aggravating his hyperlipidemia. Pertinent negatives include no chest pain or focal sensory loss. He is currently on no antihyperlipidemic treatment. The current treatment provides no improvement of lipids. There are no compliance problems.  There are no known risk factors for coronary artery disease.          Wt down. 12 lbs.  Diet going better.      Feels well.    Hep a vac done.      The following portions of the patient's history were reviewed and updated as appropriate: allergies, current medications, past family history, past medical history, past social history, past surgical history and problem list.    Review of Systems   HENT: Negative for congestion, dental problem, drooling, ear discharge and ear pain.    Cardiovascular: Negative for chest pain.   Psychiatric/Behavioral: Negative for self-injury and sleep disturbance. The patient is not nervous/anxious and is not hyperactive.    All other systems reviewed and are negative.      Current Outpatient Medications:   •  amLODIPine (NORVASC) 10 MG tablet, take 1 tablet by mouth once daily, Disp: 90 tablet, Rfl: 3  •  atorvastatin (LIPITOR) 10 MG tablet, take 1 tablet by mouth once daily, Disp: 90 tablet, Rfl: 3  •  Cyanocobalamin 2500 MCG sublingual tablet, Place 1 tablet under the tongue daily., Disp: 90 each, Rfl: 3  •  lisinopril (PRINIVIL,ZESTRIL) 10 MG tablet, Take 1 tablet by mouth Daily., Disp: 90 tablet, Rfl: 3  •  naproxen (NAPROSYN) 500 MG tablet, Take 1 tablet by mouth 2 (Two) Times a Day With Meals., Disp: 60 tablet, Rfl: 5  •  RA ASPIRIN  "EC 81 MG EC tablet, take 1 tablet by mouth once daily, Disp: 90 tablet, Rfl: 3  •  traZODone (DESYREL) 50 MG tablet, Take 1 tablet by mouth Every Night., Disp: 30 tablet, Rfl: 4    Objective      Blood pressure 120/68, pulse 59, temperature 98.1 °F (36.7 °C), temperature source Oral, resp. rate 16, height 185.4 cm (73\"), weight 92.5 kg (204 lb), SpO2 97 %.    Physical Exam     General Appearance:    Alert, cooperative, no distress, appears stated age   Head:    Normocephalic, without obvious abnormality, atraumatic   Eyes:    PERRL, conjunctiva/corneas clear, EOM's intact   Ears:    Normal TM's and external ear canals, both ears   Nose:   Nares normal, septum midline, mucosa normal, no drainage   or sinus tenderness   Throat:   Lips, mucosa, and tongue normal; teeth and gums normal   Neck:   Supple, symmetrical, trachea midline, no adenopathy;        thyroid:  No enlargement/tenderness/nodules; no carotid    bruit or JVD   Back:     Symmetric, no curvature, ROM normal, no CVA tenderness   Lungs:     Clear to auscultation bilaterally, respirations unlabored   Chest wall:    No tenderness or deformity   Heart:    Regular rate and rhythm, S1 and S2 normal, no murmur,        rub or gallop   Abdomen:     Soft, non-tender, bowel sounds active all four quadrants,     no masses, no organomegaly   Extremities:   Right leg sebaceous cyst. Extremities normal, atraumatic, no cyanosis or edema   Pulses:   2+ and symmetric all extremities   Skin:   Skin color, texture, turgor normal, no rashes or lesions   Lymph nodes:   Cervical, supraclavicular, and axillary nodes normal   Neurologic:   CNII-XII intact. Normal strength, sensation and reflexes       throughout      Results for orders placed or performed in visit on 11/12/18   PSA Screen   Result Value Ref Range    PSA 3.260 0.060 - 4.000 ng/mL   Comprehensive Metabolic Panel   Result Value Ref Range    Glucose 102 (H) 74 - 98 mg/dL    BUN 15 7 - 20 mg/dL    Creatinine 0.90 0.60 " - 1.30 mg/dL    eGFR Non African Am 85 >60 mL/min/1.73    eGFR African Am 103 >60 mL/min/1.73    BUN/Creatinine Ratio 16.7 6.3 - 21.9    Sodium 140 137 - 145 mmol/L    Potassium 4.5 3.5 - 5.1 mmol/L    Chloride 104 98 - 107 mmol/L    Total CO2 27.0 26.0 - 30.0 mmol/L    Calcium 8.9 8.4 - 10.2 mg/dL    Total Protein 6.7 6.3 - 8.2 g/dL    Albumin 3.90 3.50 - 5.00 g/dL    Globulin 2.8 gm/dL    A/G Ratio 1.4 1.0 - 2.0 g/dL    Total Bilirubin 1.0 0.2 - 1.3 mg/dL    Alkaline Phosphatase 101 38 - 126 U/L    AST (SGOT) 25 15 - 46 U/L    ALT (SGPT) 41 13 - 69 U/L         Assessment/Plan   Labs stable.  Diet going well.  Exercise difficult to get started.  Wearing seat belts.      Seng was seen today for hyperlipidemia.    Diagnoses and all orders for this visit:    Essential hypertension  -     lisinopril (PRINIVIL,ZESTRIL) 10 MG tablet; Take 1 tablet by mouth Daily.  -     Lipid Panel  -     CBC & Differential  -     Vitamin B12  -     Comprehensive Metabolic Panel  -     TSH  -     T4, Free  -     Vitamin D 25 Hydroxy    Other insomnia  -     traZODone (DESYREL) 50 MG tablet; Take 1 tablet by mouth Every Night.  -     Lipid Panel  -     CBC & Differential  -     Vitamin B12  -     Comprehensive Metabolic Panel  -     TSH  -     T4, Free  -     Vitamin D 25 Hydroxy      Return in about 6 months (around 11/13/2019).          There are no Patient Instructions on file for this visit.     Kei Jarrett MD    Assessment/Plan

## 2019-07-25 RX ORDER — FINASTERIDE 5 MG/1
TABLET, FILM COATED ORAL
Qty: 30 TABLET | Refills: 11 | Status: SHIPPED | OUTPATIENT
Start: 2019-07-25 | End: 2019-11-14

## 2019-11-07 LAB
25(OH)D3+25(OH)D2 SERPL-MCNC: 30.4 NG/ML (ref 30–100)
ALBUMIN SERPL-MCNC: 4.5 G/DL (ref 3.5–5.2)
ALBUMIN/GLOB SERPL: 1.9 G/DL
ALP SERPL-CCNC: 98 U/L (ref 39–117)
ALT SERPL-CCNC: 35 U/L (ref 1–41)
AST SERPL-CCNC: 18 U/L (ref 1–40)
BASOPHILS # BLD AUTO: 0.05 10*3/MM3 (ref 0–0.2)
BASOPHILS NFR BLD AUTO: 0.6 % (ref 0–1.5)
BILIRUB SERPL-MCNC: 0.7 MG/DL (ref 0.2–1.2)
BUN SERPL-MCNC: 14 MG/DL (ref 8–23)
BUN/CREAT SERPL: 13.9 (ref 7–25)
CALCIUM SERPL-MCNC: 9 MG/DL (ref 8.6–10.5)
CHLORIDE SERPL-SCNC: 104 MMOL/L (ref 98–107)
CHOLEST SERPL-MCNC: 126 MG/DL (ref 0–200)
CO2 SERPL-SCNC: 27.7 MMOL/L (ref 22–29)
CREAT SERPL-MCNC: 1.01 MG/DL (ref 0.76–1.27)
EOSINOPHIL # BLD AUTO: 0.14 10*3/MM3 (ref 0–0.4)
EOSINOPHIL NFR BLD AUTO: 1.6 % (ref 0.3–6.2)
ERYTHROCYTE [DISTWIDTH] IN BLOOD BY AUTOMATED COUNT: 12.8 % (ref 12.3–15.4)
GLOBULIN SER CALC-MCNC: 2.4 GM/DL
GLUCOSE SERPL-MCNC: 97 MG/DL (ref 65–99)
HCT VFR BLD AUTO: 46.1 % (ref 37.5–51)
HDLC SERPL-MCNC: 45 MG/DL (ref 40–60)
HGB BLD-MCNC: 15.5 G/DL (ref 13–17.7)
IMM GRANULOCYTES # BLD AUTO: 0.03 10*3/MM3 (ref 0–0.05)
IMM GRANULOCYTES NFR BLD AUTO: 0.3 % (ref 0–0.5)
LDLC SERPL CALC-MCNC: 67 MG/DL (ref 0–100)
LYMPHOCYTES # BLD AUTO: 1.37 10*3/MM3 (ref 0.7–3.1)
LYMPHOCYTES NFR BLD AUTO: 15.3 % (ref 19.6–45.3)
MCH RBC QN AUTO: 28.8 PG (ref 26.6–33)
MCHC RBC AUTO-ENTMCNC: 33.6 G/DL (ref 31.5–35.7)
MCV RBC AUTO: 85.7 FL (ref 79–97)
MONOCYTES # BLD AUTO: 0.56 10*3/MM3 (ref 0.1–0.9)
MONOCYTES NFR BLD AUTO: 6.2 % (ref 5–12)
NEUTROPHILS # BLD AUTO: 6.82 10*3/MM3 (ref 1.7–7)
NEUTROPHILS NFR BLD AUTO: 76 % (ref 42.7–76)
NRBC BLD AUTO-RTO: 0.1 /100 WBC (ref 0–0.2)
PLATELET # BLD AUTO: 275 10*3/MM3 (ref 140–450)
POTASSIUM SERPL-SCNC: 4.5 MMOL/L (ref 3.5–5.2)
PROT SERPL-MCNC: 6.9 G/DL (ref 6–8.5)
RBC # BLD AUTO: 5.38 10*6/MM3 (ref 4.14–5.8)
SODIUM SERPL-SCNC: 142 MMOL/L (ref 136–145)
T4 FREE SERPL-MCNC: 1.14 NG/DL (ref 0.93–1.7)
TRIGL SERPL-MCNC: 69 MG/DL (ref 0–150)
TSH SERPL DL<=0.005 MIU/L-ACNC: 2.45 UIU/ML (ref 0.27–4.2)
VIT B12 SERPL-MCNC: 539 PG/ML (ref 211–946)
VLDLC SERPL CALC-MCNC: 13.8 MG/DL
WBC # BLD AUTO: 8.97 10*3/MM3 (ref 3.4–10.8)

## 2019-11-14 ENCOUNTER — OFFICE VISIT (OUTPATIENT)
Dept: INTERNAL MEDICINE | Facility: CLINIC | Age: 64
End: 2019-11-14

## 2019-11-14 VITALS
OXYGEN SATURATION: 97 % | WEIGHT: 209.8 LBS | RESPIRATION RATE: 16 BRPM | HEART RATE: 59 BPM | DIASTOLIC BLOOD PRESSURE: 74 MMHG | BODY MASS INDEX: 27.8 KG/M2 | HEIGHT: 73 IN | TEMPERATURE: 98.1 F | SYSTOLIC BLOOD PRESSURE: 124 MMHG

## 2019-11-14 DIAGNOSIS — I10 ESSENTIAL HYPERTENSION: ICD-10-CM

## 2019-11-14 DIAGNOSIS — G47.09 OTHER INSOMNIA: ICD-10-CM

## 2019-11-14 DIAGNOSIS — R55 SYNCOPE AND COLLAPSE: Primary | ICD-10-CM

## 2019-11-14 PROCEDURE — 99214 OFFICE O/P EST MOD 30 MIN: CPT | Performed by: INTERNAL MEDICINE

## 2019-11-14 RX ORDER — TRAZODONE HYDROCHLORIDE 50 MG/1
50 TABLET ORAL NIGHTLY
Qty: 30 TABLET | Refills: 4 | Status: SHIPPED | OUTPATIENT
Start: 2019-11-14 | End: 2022-11-14 | Stop reason: SDUPTHER

## 2019-11-14 NOTE — PROGRESS NOTES
Subjective     Patient ID: Seng Seymour is a 64 y.o. male. Patient is here for management of multiple medical problems.     Chief Complaint   Patient presents with   • Hypertension     6 month follow-up     History of Present Illness     One episode of LOC and out side of god father gaudencio. Sitting on stool. Just slumped over table.  Woke with people all around. Thought heat exhaustion. No further episodes.  4th episode in 5 years. Once during the winter working hard with a horse and pt was sweating.  Other episodes during summer.  Pt was not drinking fluid and 90 deg outside. Not hydrated.    No head or facial trauma.        Episodes never worked up.        The following portions of the patient's history were reviewed and updated as appropriate: allergies, current medications, past family history, past medical history, past social history, past surgical history and problem list.    Review of Systems   Constitutional: Negative for fatigue.   HENT: Negative for congestion, ear discharge, ear pain, facial swelling, sinus pressure, sinus pain and sneezing.    Musculoskeletal: Negative for arthralgias, back pain and gait problem.   Neurological: Positive for syncope. Negative for dizziness.   Psychiatric/Behavioral: Negative for behavioral problems, confusion and decreased concentration. The patient is not nervous/anxious.    All other systems reviewed and are negative.      Current Outpatient Medications:   •  amLODIPine (NORVASC) 10 MG tablet, take 1 tablet by mouth once daily, Disp: 90 tablet, Rfl: 3  •  atorvastatin (LIPITOR) 10 MG tablet, take 1 tablet by mouth once daily, Disp: 90 tablet, Rfl: 3  •  lisinopril (PRINIVIL,ZESTRIL) 10 MG tablet, Take 1 tablet by mouth Daily., Disp: 90 tablet, Rfl: 3  •  naproxen (NAPROSYN) 500 MG tablet, Take 1 tablet by mouth 2 (Two) Times a Day With Meals., Disp: 60 tablet, Rfl: 5  •  RA ASPIRIN EC 81 MG EC tablet, take 1 tablet by mouth once daily, Disp: 90 tablet, Rfl: 3  •   "traZODone (DESYREL) 50 MG tablet, Take 1 tablet by mouth Every Night., Disp: 30 tablet, Rfl: 4    Objective      Blood pressure 124/74, pulse 59, temperature 98.1 °F (36.7 °C), temperature source Oral, resp. rate 16, height 185.4 cm (73\"), weight 95.2 kg (209 lb 12.8 oz), SpO2 97 %.    Physical Exam     General Appearance:    Alert, cooperative, no distress, appears stated age   Head:    Normocephalic, without obvious abnormality, atraumatic   Eyes:    PERRL, conjunctiva/corneas clear, EOM's intact   Ears:    Normal TM's and external ear canals, both ears   Nose:   Nares normal, septum midline, mucosa normal, no drainage   or sinus tenderness   Throat:   Lips, mucosa, and tongue normal; teeth and gums normal   Neck:   Supple, symmetrical, trachea midline, no adenopathy;        thyroid:  No enlargement/tenderness/nodules; no carotid    bruit or JVD   Back:     Symmetric, no curvature, ROM normal, no CVA tenderness   Lungs:     Clear to auscultation bilaterally, respirations unlabored   Chest wall:    No tenderness or deformity   Heart:    Regular rate and rhythm, S1 and S2 normal, no murmur,        rub or gallop   Abdomen:     Soft, non-tender, bowel sounds active all four quadrants,     no masses, no organomegaly   Extremities:   Extremities normal, atraumatic, no cyanosis or edema   Pulses:   2+ and symmetric all extremities   Skin:   Skin color, texture, turgor normal, no rashes or lesions   Lymph nodes:   Cervical, supraclavicular, and axillary nodes normal   Neurologic:   CNII-XII intact. Normal strength, sensation and reflexes       throughout      Results for orders placed or performed in visit on 05/13/19   Lipid Panel   Result Value Ref Range    Total Cholesterol 126 0 - 200 mg/dL    Triglycerides 69 0 - 150 mg/dL    HDL Cholesterol 45 40 - 60 mg/dL    VLDL Cholesterol 13.8 mg/dL    LDL Cholesterol  67 0 - 100 mg/dL   Vitamin B12   Result Value Ref Range    Vitamin B-12 539 211 - 946 pg/mL   Comprehensive " Metabolic Panel   Result Value Ref Range    Glucose 97 65 - 99 mg/dL    BUN 14 8 - 23 mg/dL    Creatinine 1.01 0.76 - 1.27 mg/dL    eGFR Non African Am 74 >60 mL/min/1.73    eGFR African Am 90 >60 mL/min/1.73    BUN/Creatinine Ratio 13.9 7.0 - 25.0    Sodium 142 136 - 145 mmol/L    Potassium 4.5 3.5 - 5.2 mmol/L    Chloride 104 98 - 107 mmol/L    Total CO2 27.7 22.0 - 29.0 mmol/L    Calcium 9.0 8.6 - 10.5 mg/dL    Total Protein 6.9 6.0 - 8.5 g/dL    Albumin 4.50 3.50 - 5.20 g/dL    Globulin 2.4 gm/dL    A/G Ratio 1.9 g/dL    Total Bilirubin 0.7 0.2 - 1.2 mg/dL    Alkaline Phosphatase 98 39 - 117 U/L    AST (SGOT) 18 1 - 40 U/L    ALT (SGPT) 35 1 - 41 U/L   TSH   Result Value Ref Range    TSH 2.450 0.270 - 4.200 uIU/mL   T4, Free   Result Value Ref Range    Free T4 1.14 0.93 - 1.70 ng/dL   Vitamin D 25 Hydroxy   Result Value Ref Range    25 Hydroxy, Vitamin D 30.4 30.0 - 100.0 ng/ml   CBC & Differential   Result Value Ref Range    WBC 8.97 3.40 - 10.80 10*3/mm3    RBC 5.38 4.14 - 5.80 10*6/mm3    Hemoglobin 15.5 13.0 - 17.7 g/dL    Hematocrit 46.1 37.5 - 51.0 %    MCV 85.7 79.0 - 97.0 fL    MCH 28.8 26.6 - 33.0 pg    MCHC 33.6 31.5 - 35.7 g/dL    RDW 12.8 12.3 - 15.4 %    Platelets 275 140 - 450 10*3/mm3    Neutrophil Rel % 76.0 42.7 - 76.0 %    Lymphocyte Rel % 15.3 (L) 19.6 - 45.3 %    Monocyte Rel % 6.2 5.0 - 12.0 %    Eosinophil Rel % 1.6 0.3 - 6.2 %    Basophil Rel % 0.6 0.0 - 1.5 %    Neutrophils Absolute 6.82 1.70 - 7.00 10*3/mm3    Lymphocytes Absolute 1.37 0.70 - 3.10 10*3/mm3    Monocytes Absolute 0.56 0.10 - 0.90 10*3/mm3    Eosinophils Absolute 0.14 0.00 - 0.40 10*3/mm3    Basophils Absolute 0.05 0.00 - 0.20 10*3/mm3    Immature Granulocyte Rel % 0.3 0.0 - 0.5 %    Immature Grans Absolute 0.03 0.00 - 0.05 10*3/mm3    nRBC 0.1 0.0 - 0.2 /100 WBC         Assessment/Plan   Hx suggest orthostatic hypotension from dehydration due to volume loss complicated with taking bp meds.    Echo 2014 with diastolic  dysfunction.    Labs look great.    Pt will move Norvasc to night time and try to drink more water on a reqular bases.    Seng was seen today for hypertension.    Diagnoses and all orders for this visit:    Syncope and collapse  -     Ambulatory Referral to Cardiology  -     Comprehensive Metabolic Panel  -     Vitamin B12  -     CBC & Differential  -     Lipid Panel  -     PSA Screen  -     TSH  -     T4, Free    Other insomnia  -     traZODone (DESYREL) 50 MG tablet; Take 1 tablet by mouth Every Night.  -     Comprehensive Metabolic Panel  -     Vitamin B12  -     CBC & Differential  -     Lipid Panel  -     PSA Screen  -     TSH  -     T4, Free    Essential hypertension  -     Comprehensive Metabolic Panel  -     Vitamin B12  -     CBC & Differential  -     Lipid Panel  -     PSA Screen  -     TSH  -     T4, Free      Return in about 6 months (around 5/14/2020).          There are no Patient Instructions on file for this visit.     Kei Jarrett MD    Assessment/Plan

## 2019-12-06 ENCOUNTER — CONSULT (OUTPATIENT)
Dept: CARDIOLOGY | Facility: CLINIC | Age: 64
End: 2019-12-06

## 2019-12-06 VITALS
WEIGHT: 211 LBS | DIASTOLIC BLOOD PRESSURE: 84 MMHG | SYSTOLIC BLOOD PRESSURE: 142 MMHG | HEART RATE: 63 BPM | BODY MASS INDEX: 27.96 KG/M2 | HEIGHT: 73 IN

## 2019-12-06 DIAGNOSIS — R55 NEUROCARDIOGENIC SYNCOPE: Primary | ICD-10-CM

## 2019-12-06 PROCEDURE — 93000 ELECTROCARDIOGRAM COMPLETE: CPT | Performed by: INTERNAL MEDICINE

## 2019-12-06 PROCEDURE — 99243 OFF/OP CNSLTJ NEW/EST LOW 30: CPT | Performed by: INTERNAL MEDICINE

## 2019-12-06 NOTE — ASSESSMENT & PLAN NOTE
· Symptomatology of syncope with prodrome of nausea consistent with neurocardiogenic origin  · EKG today is unremarkable and does not show signs of Brugada, preexcitation  · No worrisome features such as angina or heart failure symptoms nor exertional syncope  · Ensure structurally normal heart with echocardiogram  · Discussed importance of hydration  · Discussed isometric maneuvers to abort episodes  · Return to clinic in 3 months.  If syncope becomes more frequent or less typical for neurocardiogenic etiology, loop recorder placement would be indicated

## 2019-12-06 NOTE — PROGRESS NOTES
[facility] at [facility]  Cardiology Consultation Note     Seng Seymour  1955  Requesting Provider: Kei Jarrett MD  PCP: Kei Jarrett MD    ID:  Seng Seymour is a 64 y.o. male seen for a consultation visit regarding the following:     Chief Complaint   Patient presents with   • syncope and collapse   • Dizziness             Dear Krishan:    Thank you for referring Seng Seymour back to my office today, this time for evaluation of syncope.  The patient is a 64-year-old gentleman with no known cardiovascular disease who approximately 5 years ago I saw in relation to some atypical chest pain symptoms.  He was evaluated with echo and stress testing--both of which were normal.  The patient reports having isolated syncopal episodes occurring approximately once a year over the last 5 years.  His most recent episode is indicative of previous episodes and occurred this summer.  The patient has been working outside on a very hot day.  He has been waiting in line at a [employer] to eat when he developed a sense of nausea.  He rushed over to sit down at a barstool where he lost consciousness for a short period of time.  He did not lose bowel or bladder continence.  When he awoke, several people were attending to him.  He states that he drank some water and when EMS arrived he declined further evaluation.  He states that his other episodes of syncope have typically been preceded by nausea.  He denies symptoms of angina or heart failure despite taking care of horses and doing other physical activities.  None of his syncopal episodes have been in the midst of activity.  The patient states that he stays thirsty and drinks a decent amount of fluid daily.  He does not always choose water and will drink iced tea and sodas.      Past Medical History, Past Surgical History, Family history, Social History, and Medications were all reviewed with the patient today and updated as  necessary.       Current Outpatient Medications:   •  amLODIPine (NORVASC) 10 MG tablet, take 1 tablet by mouth once daily, Disp: 90 tablet, Rfl: 3  •  atorvastatin (LIPITOR) 10 MG tablet, take 1 tablet by mouth once daily, Disp: 90 tablet, Rfl: 3  •  lisinopril (PRINIVIL,ZESTRIL) 10 MG tablet, Take 1 tablet by mouth Daily., Disp: 90 tablet, Rfl: 3  •  naproxen (NAPROSYN) 500 MG tablet, Take 1 tablet by mouth 2 (Two) Times a Day With Meals. (Patient taking differently: Take 500 mg by mouth As Needed.), Disp: 60 tablet, Rfl: 5  •  RA ASPIRIN EC 81 MG EC tablet, take 1 tablet by mouth once daily, Disp: 90 tablet, Rfl: 3  •  traZODone (DESYREL) 50 MG tablet, Take 1 tablet by mouth Every Night. (Patient taking differently: Take 50 mg by mouth As Needed.), Disp: 30 tablet, Rfl: 4    No Known Allergies      Past Medical History:   Diagnosis Date   • Hyperlipidemia    • Hypertension    • Parkinson disease (CMS/HCC)      Past Surgical History:   Procedure Laterality Date   • HERNIA REPAIR     • NASAL SEPTAL RECONSTRUCTION       Family History   Problem Relation Age of Onset   • Cancer Mother    • Lung cancer Mother    • Parkinsonism Father    • Hypertension Other      Social History     Tobacco Use   • Smoking status: Never Smoker   • Smokeless tobacco: Never Used   Substance Use Topics   • Alcohol use: No     Frequency: Never       Review of Systems   Constitution: Negative for weakness and malaise/fatigue.   HENT: Positive for tinnitus.    Eyes: Negative for vision loss in left eye and vision loss in right eye.   Cardiovascular: Positive for syncope. Negative for chest pain, dyspnea on exertion, near-syncope, orthopnea, palpitations and paroxysmal nocturnal dyspnea.   Musculoskeletal: Negative for myalgias.   Neurological: Negative for brief paralysis, excessive daytime sleepiness, focal weakness, numbness and paresthesias.   All other systems reviewed and are negative.              /84 (BP Location: Right arm,  "Patient Position: Sitting)   Pulse 63   Ht 185.4 cm (73\")   Wt 95.7 kg (211 lb)   BMI 27.84 kg/m²        Physical Exam   Constitutional: He is oriented to person, place, and time. He appears well-developed and well-nourished.   HENT:   Head: Normocephalic and atraumatic.   Eyes: Pupils are equal, round, and reactive to light. No scleral icterus.   Neck: No JVD present. Carotid bruit is not present. No thyromegaly present.   Cardiovascular: Normal rate and regular rhythm. Exam reveals no gallop.   No murmur heard.  Pulmonary/Chest: Effort normal and breath sounds normal.   Abdominal: Soft. He exhibits no distension. There is no hepatosplenomegaly.   Musculoskeletal: He exhibits no edema.   Neurological: He is alert and oriented to person, place, and time.   Skin: Skin is warm and dry.   Psychiatric: He has a normal mood and affect. His behavior is normal.           ECG 12 Lead  Date/Time: 12/6/2019 11:23 AM  Performed by: Robi Jain IV, MD  Authorized by: Robi Jian IV, MD   Comparison: not compared with previous ECG   Previous ECG: no previous ECG available  Rhythm: sinus rhythm  Rate: normal  BPM: 63  QRS axis: normal  Other findings: low voltage    Clinical impression: normal ECG          Lab Results   Component Value Date    CHOL 144 11/30/2016    CHLPL 126 11/07/2019    TRIG 69 11/07/2019    HDL 45 11/07/2019    LDL 67 11/07/2019      Lab Results   Component Value Date    GLUCOSE 64 (L) 11/30/2016    BUN 14 11/07/2019    CREATININE 1.01 11/07/2019    EGFRIFNONA 74 11/07/2019    EGFRIFAFRI 90 11/07/2019    BCR 13.9 11/07/2019    K 4.5 11/07/2019    CO2 27.7 11/07/2019    CALCIUM 9.0 11/07/2019    PROTENTOTREF 6.9 11/07/2019    ALBUMIN 4.50 11/07/2019    LABIL2 1.9 11/07/2019    AST 18 11/07/2019    ALT 35 11/07/2019           Problem List Items Addressed This Visit        Cardiology Problems    Neurocardiogenic syncope - Primary    Overview     · Infrequent episodes of syncope " preceded by nausea  · Echocardiogram (08/15/2014): LVEF 65%. No valvular abnormalities.   · EKG (12/6/2019): Sinus rhythm.  Normal ST and T-segment         Current Assessment & Plan     · Symptomatology of syncope with prodrome of nausea consistent with neurocardiogenic origin  · EKG today is unremarkable and does not show signs of Brugada, preexcitation  · No worrisome features such as angina or heart failure symptoms nor exertional syncope  · Ensure structurally normal heart with echocardiogram  · Discussed importance of hydration  · Discussed isometric maneuvers to abort episodes  · Return to clinic in 3 months.  If syncope becomes more frequent or less typical for neurocardiogenic etiology, loop recorder placement would be indicated                      · Ensure structurally normal heart with echocardiogram  · Discussed importance of hydration  · Discussed isometric maneuvers to abort episodes  · Return to clinic in 3 months.  If syncope becomes more frequent or less typical for neurocardiogenic etiology, loop recorder placement would be indicated        VIRGILIO Jain MD [facility], [facility]  Interventional and General Cardiology

## 2020-03-04 ENCOUNTER — OFFICE VISIT (OUTPATIENT)
Dept: INTERNAL MEDICINE | Facility: CLINIC | Age: 65
End: 2020-03-04

## 2020-03-04 VITALS
HEIGHT: 73 IN | TEMPERATURE: 97.6 F | SYSTOLIC BLOOD PRESSURE: 138 MMHG | OXYGEN SATURATION: 99 % | DIASTOLIC BLOOD PRESSURE: 82 MMHG | BODY MASS INDEX: 27.96 KG/M2 | WEIGHT: 211 LBS | HEART RATE: 64 BPM

## 2020-03-04 DIAGNOSIS — H65.93 BILATERAL OTITIS MEDIA WITH EFFUSION: ICD-10-CM

## 2020-03-04 DIAGNOSIS — R09.81 NASAL CONGESTION DUE TO PROLONGED USE OF DECONGESTANTS: Primary | ICD-10-CM

## 2020-03-04 DIAGNOSIS — I10 ESSENTIAL HYPERTENSION: ICD-10-CM

## 2020-03-04 DIAGNOSIS — R55 SYNCOPE AND COLLAPSE: Primary | ICD-10-CM

## 2020-03-04 DIAGNOSIS — T48.5X5A NASAL CONGESTION DUE TO PROLONGED USE OF DECONGESTANTS: Primary | ICD-10-CM

## 2020-03-04 PROCEDURE — 99214 OFFICE O/P EST MOD 30 MIN: CPT | Performed by: NURSE PRACTITIONER

## 2020-03-04 RX ORDER — AZELASTINE 1 MG/ML
2 SPRAY, METERED NASAL 2 TIMES DAILY
Qty: 30 ML | Refills: 0 | Status: SHIPPED | OUTPATIENT
Start: 2020-03-04 | End: 2020-03-14

## 2020-03-04 NOTE — PROGRESS NOTES
"Date: 2020    Name: Seng Seymour  : 1955    Chief Complaint:   Chief Complaint   Patient presents with   • URI     x 1 month       HPI:  Seng Seymour is a 64 y.o. male presents with nasal congestion for over a month.  States about 5 weeks ago, had a nasty head cold.  Symptoms improved after about 2 weeks.  He continues to have nasal congestion with occasional clear rhinorrhea.  He has been using afrin nasal spray daily, if not 2-3 x a day, since onset of symptoms.  Currently denies headache, dizziness, earache, sneezing, rash, cough, SOA, wheezing, fever, chills, decreased appetite, fatigue.    He is currently taking amlodipine, lisinopril for HTN.  Does not monitor BP at home.  Denies chest pain, dyspnea, orthopnea, palpitations, lower extremity edema, headaches, weakness, visual disturbances or confusion.    History:  The following portions of the patient's history were reviewed and updated as appropriate: allergies, current medications, past medical history, family history, surgical history, social history and problem list.     ROS:  Review of Systems   Constitutional: Negative.    HENT: Positive for tinnitus. Negative for ear discharge, sinus pressure, trouble swallowing and voice change.    Eyes: Negative.    Respiratory: Negative.    Cardiovascular: Negative.    Musculoskeletal: Negative for myalgias.   Skin: Negative.    Neurological: Negative.        VS:  Vitals:    20 0820   BP: 138/82   Pulse: 64   Temp: 97.6 °F (36.4 °C)   TempSrc: Temporal   SpO2: 99%   Weight: 95.7 kg (211 lb)   Height: 185.4 cm (73\")     Body mass index is 27.84 kg/m².    PE:  Physical Exam   Constitutional: He is oriented to person, place, and time. He appears well-developed and well-nourished. No distress.   HENT:   Head: Normocephalic.   Right Ear: External ear and ear canal normal.   Left Ear: External ear and ear canal normal. A middle ear effusion is present.   Nose: Mucosal edema and congestion " present. No sinus tenderness.   Mouth/Throat: Uvula is midline, oropharynx is clear and moist and mucous membranes are normal.   Eyes: Pupils are equal, round, and reactive to light. Conjunctivae are normal.   Neck: Normal range of motion. Neck supple.   Cardiovascular: Normal rate, regular rhythm, normal heart sounds and intact distal pulses.   Pulmonary/Chest: Effort normal and breath sounds normal.   Lymphadenopathy:     He has no cervical adenopathy.   Neurological: He is alert and oriented to person, place, and time.   Skin: Skin is warm. Capillary refill takes less than 2 seconds.   Psychiatric: He has a normal mood and affect. His behavior is normal.         Assessment/Plan:  Seng was seen today for uri.    Diagnoses and all orders for this visit:    Nasal congestion due to prolonged use of decongestants  -     azelastine (ASTELIN) 0.1 % nasal spray; 2 sprays into the nostril(s) as directed by provider daily for 10 days. Use in each nostril as directed  - Flonase, per package directions, one spray each nostril once a day, prior to astelin  - Discontinue afrin use, advised it should only be used for 2-3 days due to rebound congestion  - Drink plenty of clear, decaffeinated fluids, as tolerated.    Essential hypertension        - Follow heart healthy diet.  Advised to reduce daily sodium intake to < 1500 mg per day.  Avoid processed & fast foods.        - Exercise as tolerated, with a goal of 30 minutes of moderate exercise most days.         - Take medications as prescribed.    Bilateral otitis media with effusion        - flonase per package directions    Return if symptoms worsen or fail to improve.

## 2020-03-04 NOTE — PROGRESS NOTES
[facility] at [facility]  Office Visit Note    Encounter Date:03/06/2020    Patient ID: Seng Seymour is a 64 y.o. male who resides in [place], [place]    CC/Reason for visit:    • Neurocardiogenic syncope         Problem List Items Addressed This Visit        Cardiovascular and Mediastinum    Neurocardiogenic syncope    Overview     · Infrequent episodes of syncope preceded by nausea  · Echocardiogram (08/15/2014): LVEF 65%. No valvular abnormalities.   · EKG (12/6/2019): Sinus rhythm.  Normal ST and T-segment  · Echo (3/6/2020): Normal LVEF.  No valvular abnormalities         Current Assessment & Plan     · No further episodes of syncope  · Stay well-hydrated and change positions slowly         Hypercholesterolemia    Current Assessment & Plan     · Continue Lipitor 10 mg daily  · Follow-up PCP for management         Essential hypertension - Primary    Current Assessment & Plan     · Hypertension is borderline  · Continue amlodipine 10 mg daily  · Continue lisinopril 10 mg daily             Patient is doing well from a cardiovascular standpoint.  He has had no further syncope.  If further syncopal spells occur and are less typical for neurocardiogenic etiology would consider 30-day monitor versus loop implant.  Continue current medications follow-up 12 months or sooner if needed       · If syncope becomes more frequent or less typical for neurocardiogenic etiology, loop recorder placement would be indicated  Return in about 1 year (around 3/6/2021), or if symptoms worsen or fail to improve, for Follow-up with Dr. Jain next visit.              Seng Seymour returns today for reevaluation.  The patient was evaluated by cardiology in 2015 for atypical chest pain.  An echocardiogram performed at that time showed a normal LVEF and no valvular abnormality and stress test was normal without evidence of ischemia. Patient then presented back for reevaluation in January for evaluation  of dizziness and syncope. His symptoms were felt to be due to neurocardiogenic syncope and he was instructed to increase hydration and use isovolumetric exercises.  An echocardiogram was ordered and was completed earlier today to ensure a structurally normal heart.  Echo shows normal LVEF and no valvular abnormality.  Since his visit in January he has not had any more syncopal spells.  He denies chest pain/pressure or tightness.  Overall he feels well.  He has been trying to stay better hydrated.    Review of Systems   Constitution: Negative for malaise/fatigue.   Eyes: Negative for vision loss in left eye and vision loss in right eye.   Cardiovascular: Negative for chest pain, dyspnea on exertion, near-syncope, orthopnea, palpitations, paroxysmal nocturnal dyspnea and syncope.   Musculoskeletal: Negative for myalgias.   Neurological: Negative for brief paralysis, excessive daytime sleepiness, focal weakness, numbness, paresthesias and weakness.   All other systems reviewed and are negative.      The patient's past medical, social, family history and ROS reviewed in the patient's electronic medical record.    No Known Allergies      Current Outpatient Medications:   •  amLODIPine (NORVASC) 10 MG tablet, take 1 tablet by mouth once daily, Disp: 90 tablet, Rfl: 3  •  atorvastatin (LIPITOR) 10 MG tablet, take 1 tablet by mouth once daily, Disp: 90 tablet, Rfl: 3  •  azelastine (ASTELIN) 0.1 % nasal spray, 2 sprays into the nostril(s) as directed by provider 2 (Two) Times a Day for 10 days. Use in each nostril as directed, Disp: 30 mL, Rfl: 0  •  lisinopril (PRINIVIL,ZESTRIL) 10 MG tablet, Take 1 tablet by mouth Daily., Disp: 90 tablet, Rfl: 3  •  naproxen (NAPROSYN) 500 MG tablet, Take 1 tablet by mouth 2 (Two) Times a Day With Meals. (Patient taking differently: Take 500 mg by mouth As Needed.), Disp: 60 tablet, Rfl: 5  •  RA ASPIRIN EC 81 MG EC tablet, take 1 tablet by mouth once daily, Disp: 90 tablet, Rfl: 3  •   "traZODone (DESYREL) 50 MG tablet, Take 1 tablet by mouth Every Night. (Patient taking differently: Take 50 mg by mouth As Needed.), Disp: 30 tablet, Rfl: 4    Past Medical History:   Diagnosis Date   • Hyperlipidemia    • Hypertension    • Parkinson disease (CMS/HCC)        Past Surgical History:   Procedure Laterality Date   • HERNIA REPAIR     • NASAL SEPTAL RECONSTRUCTION         Family History   Problem Relation Age of Onset   • Cancer Mother    • Lung cancer Mother    • Parkinsonism Father    • Hypertension Other        Social History     Tobacco Use   • Smoking status: Never Smoker   • Smokeless tobacco: Never Used   Substance Use Topics   • Alcohol use: No     Frequency: Never           Blood pressure 138/72, pulse 64, height 185.4 cm (73\"), weight 95.7 kg (211 lb), SpO2 98 %.  Body mass index is 27.84 kg/m².  Vitals:    03/06/20 1342   Patient Position: Sitting       Physical Exam   Constitutional: He is oriented to person, place, and time. He appears well-developed and well-nourished.   HENT:   Head: Normocephalic and atraumatic.   Eyes: Conjunctivae are normal. No scleral icterus.   Neck: Normal range of motion. No JVD present. Carotid bruit is not present. No thyromegaly present.   Cardiovascular: Normal rate and regular rhythm. Exam reveals no gallop.   No murmur heard.  Pulmonary/Chest: Effort normal and breath sounds normal.   Abdominal: Soft. He exhibits no distension and no mass. There is no hepatosplenomegaly.   Musculoskeletal: He exhibits no edema.   Neurological: He is alert and oriented to person, place, and time.   Skin: Skin is warm and dry. No rash noted.   Psychiatric: He has a normal mood and affect. His behavior is normal.       Data Review (reviewed with patient):     Procedures    Lab Results   Component Value Date    CHOL 144 11/30/2016    TRIG 69 11/07/2019    HDL 45 11/07/2019    LDL 67 11/07/2019    AST 18 11/07/2019    ALT 35 11/07/2019       No results found for: " HGBA1C        Blanca Penn, RAYMOND    3/6/2020

## 2020-03-06 ENCOUNTER — OFFICE VISIT (OUTPATIENT)
Dept: CARDIOLOGY | Facility: CLINIC | Age: 65
End: 2020-03-06

## 2020-03-06 ENCOUNTER — HOSPITAL ENCOUNTER (OUTPATIENT)
Dept: CARDIOLOGY | Facility: HOSPITAL | Age: 65
Discharge: HOME | End: 2020-03-06
Admitting: INTERNAL MEDICINE

## 2020-03-06 VITALS
WEIGHT: 211 LBS | BODY MASS INDEX: 27.96 KG/M2 | HEART RATE: 64 BPM | DIASTOLIC BLOOD PRESSURE: 72 MMHG | OXYGEN SATURATION: 98 % | SYSTOLIC BLOOD PRESSURE: 138 MMHG | HEIGHT: 73 IN

## 2020-03-06 DIAGNOSIS — R55 SYNCOPE AND COLLAPSE: ICD-10-CM

## 2020-03-06 DIAGNOSIS — R55 NEUROCARDIOGENIC SYNCOPE: ICD-10-CM

## 2020-03-06 DIAGNOSIS — I10 ESSENTIAL HYPERTENSION: Primary | ICD-10-CM

## 2020-03-06 DIAGNOSIS — E78.00 HYPERCHOLESTEROLEMIA: ICD-10-CM

## 2020-03-06 PROCEDURE — 99213 OFFICE O/P EST LOW 20 MIN: CPT | Performed by: NURSE PRACTITIONER

## 2020-03-06 PROCEDURE — 93306 TTE W/DOPPLER COMPLETE: CPT

## 2020-03-06 PROCEDURE — 93306 TTE W/DOPPLER COMPLETE: CPT | Performed by: INTERNAL MEDICINE

## 2020-03-06 NOTE — ASSESSMENT & PLAN NOTE
· Hypertension is borderline  · Continue amlodipine 10 mg daily  · Continue lisinopril 10 mg daily

## 2020-03-10 LAB
BH CV ECHO MEAS - AO MAX PG (FULL): 3.6 MMHG
BH CV ECHO MEAS - AO MAX PG: 8 MMHG
BH CV ECHO MEAS - AO MEAN PG (FULL): 2 MMHG
BH CV ECHO MEAS - AO MEAN PG: 4 MMHG
BH CV ECHO MEAS - AO ROOT AREA (BSA CORRECTED): 1.6
BH CV ECHO MEAS - AO ROOT AREA: 10.2 CM^2
BH CV ECHO MEAS - AO ROOT DIAM: 3.6 CM
BH CV ECHO MEAS - AO V2 MAX: 144 CM/SEC
BH CV ECHO MEAS - AO V2 MEAN: 94.7 CM/SEC
BH CV ECHO MEAS - AO V2 VTI: 31 CM
BH CV ECHO MEAS - ASC AORTA: 3.9 CM
BH CV ECHO MEAS - AVA(I,A): 3.7 CM^2
BH CV ECHO MEAS - AVA(I,D): 3.7 CM^2
BH CV ECHO MEAS - AVA(V,A): 3.6 CM^2
BH CV ECHO MEAS - AVA(V,D): 3.6 CM^2
BH CV ECHO MEAS - BSA(HAYCOCK): 2.2 M^2
BH CV ECHO MEAS - BSA: 2.2 M^2
BH CV ECHO MEAS - BZI_BMI: 27.8 KILOGRAMS/M^2
BH CV ECHO MEAS - BZI_METRIC_HEIGHT: 185.4 CM
BH CV ECHO MEAS - BZI_METRIC_WEIGHT: 95.7 KG
BH CV ECHO MEAS - EDV(CUBED): 107.2 ML
BH CV ECHO MEAS - EDV(MOD-SP2): 105 ML
BH CV ECHO MEAS - EDV(MOD-SP4): 85 ML
BH CV ECHO MEAS - EDV(TEICH): 104.9 ML
BH CV ECHO MEAS - EF(CUBED): 71.9 %
BH CV ECHO MEAS - EF(MOD-BP): 60 %
BH CV ECHO MEAS - EF(MOD-SP4): 63.5 %
BH CV ECHO MEAS - EF(TEICH): 63.6 %
BH CV ECHO MEAS - ESV(CUBED): 30.1 ML
BH CV ECHO MEAS - ESV(MOD-SP4): 31 ML
BH CV ECHO MEAS - ESV(TEICH): 38.2 ML
BH CV ECHO MEAS - FS: 34.5 %
BH CV ECHO MEAS - IVS/LVPW: 1.1
BH CV ECHO MEAS - IVSD: 1.1 CM
BH CV ECHO MEAS - LA DIMENSION: 3.6 CM
BH CV ECHO MEAS - LA/AO: 1
BH CV ECHO MEAS - LAD MAJOR: 4.5 CM
BH CV ECHO MEAS - LAT PEAK E' VEL: 12.1 CM/SEC
BH CV ECHO MEAS - LATERAL E/E' RATIO: 5.1
BH CV ECHO MEAS - LV DIASTOLIC VOL/BSA (35-75): 38.6 ML/M^2
BH CV ECHO MEAS - LV MASS(C)D: 174.9 GRAMS
BH CV ECHO MEAS - LV MASS(C)DI: 79.4 GRAMS/M^2
BH CV ECHO MEAS - LV MAX PG: 4.4 MMHG
BH CV ECHO MEAS - LV MEAN PG: 2 MMHG
BH CV ECHO MEAS - LV SYSTOLIC VOL/BSA (12-30): 14.1 ML/M^2
BH CV ECHO MEAS - LV V1 MAX: 105 CM/SEC
BH CV ECHO MEAS - LV V1 MEAN: 67.2 CM/SEC
BH CV ECHO MEAS - LV V1 VTI: 23.2 CM
BH CV ECHO MEAS - LVIDD: 4.8 CM
BH CV ECHO MEAS - LVIDS: 3.1 CM
BH CV ECHO MEAS - LVLD AP2: 8.4 CM
BH CV ECHO MEAS - LVLD AP4: 7.7 CM
BH CV ECHO MEAS - LVLS AP4: 6.2 CM
BH CV ECHO MEAS - LVOT AREA (M): 4.9 CM^2
BH CV ECHO MEAS - LVOT AREA: 4.9 CM^2
BH CV ECHO MEAS - LVOT DIAM: 2.5 CM
BH CV ECHO MEAS - LVPWD: 1 CM
BH CV ECHO MEAS - MED PEAK E' VEL: 8.8 CM/SEC
BH CV ECHO MEAS - MEDIAL E/E' RATIO: 7.1
BH CV ECHO MEAS - MV A MAX VEL: 82.4 CM/SEC
BH CV ECHO MEAS - MV DEC TIME: 0.29 SEC
BH CV ECHO MEAS - MV E MAX VEL: 62.2 CM/SEC
BH CV ECHO MEAS - MV E/A: 0.75
BH CV ECHO MEAS - PA ACC SLOPE: 674 CM/SEC^2
BH CV ECHO MEAS - PA ACC TIME: 0.14 SEC
BH CV ECHO MEAS - PA PR(ACCEL): 16 MMHG
BH CV ECHO MEAS - PI END-D VEL: 83.6 CM/SEC
BH CV ECHO MEAS - RAP SYSTOLE: 3 MMHG
BH CV ECHO MEAS - RVDD: 3.7 CM
BH CV ECHO MEAS - RVSP: 17 MMHG
BH CV ECHO MEAS - SI(AO): 143.3 ML/M^2
BH CV ECHO MEAS - SI(CUBED): 35 ML/M^2
BH CV ECHO MEAS - SI(LVOT): 51.7 ML/M^2
BH CV ECHO MEAS - SI(MOD-SP4): 24.5 ML/M^2
BH CV ECHO MEAS - SI(TEICH): 30.3 ML/M^2
BH CV ECHO MEAS - SV(AO): 315.5 ML
BH CV ECHO MEAS - SV(CUBED): 77.1 ML
BH CV ECHO MEAS - SV(LVOT): 113.9 ML
BH CV ECHO MEAS - SV(MOD-SP4): 54 ML
BH CV ECHO MEAS - SV(TEICH): 66.7 ML
BH CV ECHO MEAS - TR MAX PG: 14 MMHG
BH CV ECHO MEAS - TR MAX VEL: 189 CM/SEC
BH CV ECHO MEASUREMENTS AVERAGE E/E' RATIO: 5.95
BH CV XLRA - RV BASE: 3.2 CM
BH CV XLRA - RV LENGTH: 6 CM
LEFT ATRIUM VOLUME INDEX: 19.5 ML/M^2
LEFT ATRIUM VOLUME: 43 ML
LV EF 2D ECHO EST: 65 %

## 2020-05-14 DIAGNOSIS — I10 ESSENTIAL HYPERTENSION: ICD-10-CM

## 2020-05-14 RX ORDER — AMLODIPINE BESYLATE 10 MG/1
TABLET ORAL
Qty: 90 TABLET | Refills: 0 | Status: SHIPPED | OUTPATIENT
Start: 2020-05-14 | End: 2020-08-05

## 2020-05-18 RX ORDER — ASPIRIN 81 MG/1
TABLET, COATED ORAL
Qty: 90 TABLET | Refills: 3 | Status: SHIPPED | OUTPATIENT
Start: 2020-05-18

## 2020-06-10 ENCOUNTER — TELEPHONE (OUTPATIENT)
Dept: INTERNAL MEDICINE | Facility: CLINIC | Age: 65
End: 2020-06-10

## 2020-06-10 NOTE — TELEPHONE ENCOUNTER
Called patient back and informed there were active labs in his chart and to have those done before his appointment.

## 2020-06-10 NOTE — TELEPHONE ENCOUNTER
PATIENT HAS AN APPT WITH DR HERNANDEZ ON Monday 6/15 AT 2:30 AND IS WONDERING IF DR HERNANDEZ WANTS HIM TO HAVE BLOOD WORK DONE PRIOR TO APPOINTMENT.  PLEASE CALL AND ADVISE.  [phone]

## 2020-06-11 LAB
ALBUMIN SERPL-MCNC: 4.2 G/DL (ref 3.5–5.2)
ALBUMIN/GLOB SERPL: 1.4 G/DL
ALP SERPL-CCNC: 118 U/L (ref 39–117)
ALT SERPL-CCNC: 27 U/L (ref 1–41)
AST SERPL-CCNC: 15 U/L (ref 1–40)
BASOPHILS # BLD AUTO: 0.05 10*3/MM3 (ref 0–0.2)
BASOPHILS NFR BLD AUTO: 0.5 % (ref 0–1.5)
BILIRUB SERPL-MCNC: 0.4 MG/DL (ref 0.2–1.2)
BUN SERPL-MCNC: 14 MG/DL (ref 8–23)
BUN/CREAT SERPL: 13.7 (ref 7–25)
CALCIUM SERPL-MCNC: 9.3 MG/DL (ref 8.6–10.5)
CHLORIDE SERPL-SCNC: 105 MMOL/L (ref 98–107)
CHOLEST SERPL-MCNC: 126 MG/DL (ref 0–200)
CO2 SERPL-SCNC: 25.8 MMOL/L (ref 22–29)
CREAT SERPL-MCNC: 1.02 MG/DL (ref 0.76–1.27)
EOSINOPHIL # BLD AUTO: 0.17 10*3/MM3 (ref 0–0.4)
EOSINOPHIL NFR BLD AUTO: 1.7 % (ref 0.3–6.2)
ERYTHROCYTE [DISTWIDTH] IN BLOOD BY AUTOMATED COUNT: 12.7 % (ref 12.3–15.4)
GLOBULIN SER CALC-MCNC: 2.9 GM/DL
GLUCOSE SERPL-MCNC: 102 MG/DL (ref 65–99)
HCT VFR BLD AUTO: 44.8 % (ref 37.5–51)
HDLC SERPL-MCNC: 44 MG/DL (ref 40–60)
HGB BLD-MCNC: 15 G/DL (ref 13–17.7)
IMM GRANULOCYTES # BLD AUTO: 0.03 10*3/MM3 (ref 0–0.05)
IMM GRANULOCYTES NFR BLD AUTO: 0.3 % (ref 0–0.5)
LDLC SERPL CALC-MCNC: 70 MG/DL (ref 0–100)
LYMPHOCYTES # BLD AUTO: 1.84 10*3/MM3 (ref 0.7–3.1)
LYMPHOCYTES NFR BLD AUTO: 17.9 % (ref 19.6–45.3)
MCH RBC QN AUTO: 28.3 PG (ref 26.6–33)
MCHC RBC AUTO-ENTMCNC: 33.5 G/DL (ref 31.5–35.7)
MCV RBC AUTO: 84.5 FL (ref 79–97)
MONOCYTES # BLD AUTO: 0.68 10*3/MM3 (ref 0.1–0.9)
MONOCYTES NFR BLD AUTO: 6.6 % (ref 5–12)
NEUTROPHILS # BLD AUTO: 7.52 10*3/MM3 (ref 1.7–7)
NEUTROPHILS NFR BLD AUTO: 73 % (ref 42.7–76)
NRBC BLD AUTO-RTO: 0 /100 WBC (ref 0–0.2)
PLATELET # BLD AUTO: 298 10*3/MM3 (ref 140–450)
POTASSIUM SERPL-SCNC: 4.3 MMOL/L (ref 3.5–5.2)
PROT SERPL-MCNC: 7.1 G/DL (ref 6–8.5)
PSA SERPL-MCNC: 4.19 NG/ML (ref 0–4)
RBC # BLD AUTO: 5.3 10*6/MM3 (ref 4.14–5.8)
SODIUM SERPL-SCNC: 140 MMOL/L (ref 136–145)
T4 FREE SERPL-MCNC: 1.08 NG/DL (ref 0.93–1.7)
TRIGL SERPL-MCNC: 61 MG/DL (ref 0–150)
TSH SERPL DL<=0.005 MIU/L-ACNC: 5.08 UIU/ML (ref 0.27–4.2)
VIT B12 SERPL-MCNC: 731 PG/ML (ref 211–946)
VLDLC SERPL CALC-MCNC: 12.2 MG/DL
WBC # BLD AUTO: 10.29 10*3/MM3 (ref 3.4–10.8)

## 2020-06-15 ENCOUNTER — OFFICE VISIT (OUTPATIENT)
Dept: INTERNAL MEDICINE | Facility: CLINIC | Age: 65
End: 2020-06-15

## 2020-06-15 VITALS
TEMPERATURE: 98.4 F | HEIGHT: 73 IN | WEIGHT: 211.4 LBS | OXYGEN SATURATION: 97 % | RESPIRATION RATE: 16 BRPM | BODY MASS INDEX: 28.02 KG/M2 | HEART RATE: 67 BPM | SYSTOLIC BLOOD PRESSURE: 120 MMHG | DIASTOLIC BLOOD PRESSURE: 75 MMHG

## 2020-06-15 DIAGNOSIS — E03.8 SUBCLINICAL HYPOTHYROIDISM: ICD-10-CM

## 2020-06-15 DIAGNOSIS — J32.0 CHRONIC MAXILLARY SINUSITIS: ICD-10-CM

## 2020-06-15 DIAGNOSIS — R97.20 ELEVATED PSA: ICD-10-CM

## 2020-06-15 DIAGNOSIS — I10 ESSENTIAL HYPERTENSION: Primary | ICD-10-CM

## 2020-06-15 PROCEDURE — 99214 OFFICE O/P EST MOD 30 MIN: CPT | Performed by: INTERNAL MEDICINE

## 2020-06-15 RX ORDER — FLUTICASONE PROPIONATE 50 MCG
1 SPRAY, SUSPENSION (ML) NASAL AS NEEDED
COMMUNITY

## 2020-06-15 RX ORDER — DOXYCYCLINE 100 MG/1
100 CAPSULE ORAL EVERY 12 HOURS SCHEDULED
Qty: 20 CAPSULE | Refills: 0 | Status: SHIPPED | OUTPATIENT
Start: 2020-06-15 | End: 2020-09-17

## 2020-06-15 NOTE — PROGRESS NOTES
Subjective     Patient ID: Seng Seymour is a 65 y.o. male. Patient is here for management of multiple medical problems.     Chief Complaint   Patient presents with   • Hypertension     follow-up   • Nasal Congestion     patient continuing to have nasal congestion, taking Flonase but it is not helping at night.      History of Present Illness           Hypertension.  bp stable.   Diet going well    Will start the gym soon.       Congestion got some better with flonase and antihyst.   Has to sleep on left side.      The following portions of the patient's history were reviewed and updated as appropriate: allergies, current medications, past family history, past medical history, past social history, past surgical history and problem list.    Review of Systems   Constitutional: Positive for fatigue.   Respiratory: Negative for cough and choking.    Genitourinary: Negative for scrotal swelling, testicular pain and urgency.   Psychiatric/Behavioral: Negative for self-injury and sleep disturbance. The patient is not nervous/anxious.    All other systems reviewed and are negative.      Current Outpatient Medications:   •  amLODIPine (NORVASC) 10 MG tablet, TAKE 1 TABLET BY MOUTH ONCE DAILY, Disp: 90 tablet, Rfl: 0  •  ASPIRIN LOW DOSE 81 MG EC tablet, TAKE 1 TABLET BY MOUTH ONCE DAILY, Disp: 90 tablet, Rfl: 3  •  atorvastatin (LIPITOR) 10 MG tablet, take 1 tablet by mouth once daily, Disp: 90 tablet, Rfl: 3  •  fluticasone (FLONASE) 50 MCG/ACT nasal spray, 1 spray into the nostril(s) as directed by provider Daily., Disp: , Rfl:   •  lisinopril (PRINIVIL,ZESTRIL) 10 MG tablet, Take 1 tablet by mouth Daily., Disp: 90 tablet, Rfl: 3  •  naproxen (NAPROSYN) 500 MG tablet, Take 1 tablet by mouth 2 (Two) Times a Day With Meals. (Patient taking differently: Take 500 mg by mouth As Needed.), Disp: 60 tablet, Rfl: 5  •  traZODone (DESYREL) 50 MG tablet, Take 1 tablet by mouth Every Night. (Patient taking differently: Take 50  "mg by mouth As Needed.), Disp: 30 tablet, Rfl: 4    Objective      Blood pressure 120/75, pulse 67, temperature 98.4 °F (36.9 °C), temperature source Temporal, resp. rate 16, height 185.4 cm (73\"), weight 95.9 kg (211 lb 6.4 oz), SpO2 97 %.    Physical Exam     General Appearance:    Alert, cooperative, no distress, appears stated age   Head:    Normocephalic, without obvious abnormality, atraumatic   Eyes:    PERRL, conjunctiva/corneas clear, EOM's intact   Ears:    Normal TM's and external ear canals, both ears   Nose:   Nares normal, septum midline, mucosa normal, no drainage   + sinus tenderness   Throat:   Lips, mucosa, and tongue normal; teeth and gums normal   Neck:   Supple, symmetrical, trachea midline, no adenopathy;        thyroid:  No enlargement/tenderness/nodules; no carotid    bruit or JVD   Back:     Symmetric, no curvature, ROM normal, no CVA tenderness   Lungs:     Clear to auscultation bilaterally, respirations unlabored   Chest wall:    No tenderness or deformity   Heart:    Regular rate and rhythm, S1 and S2 normal, no murmur,        rub or gallop   Abdomen:     Soft, non-tender, bowel sounds active all four quadrants,     no masses, no organomegaly   Extremities:   Extremities normal, atraumatic, no cyanosis or edema   Pulses:   2+ and symmetric all extremities   Skin:   Skin color, texture, turgor normal, no rashes or lesions   Lymph nodes:   Cervical, supraclavicular, and axillary nodes normal   Neurologic:   CNII-XII intact. Normal strength, sensation and reflexes       throughout      Results for orders placed or performed during the hospital encounter of 03/06/20   Adult Transthoracic Echo Complete W/ Cont if Necessary Per Protocol   Result Value Ref Range    BSA 2.2 m^2    IVSd 1.1 cm    LVIDd 4.8 cm    LVIDs 3.1 cm    LVPWd 1.0 cm    IVS/LVPW 1.1     FS 34.5 %    EDV(Teich) 104.9 ml    ESV(Teich) 38.2 ml    EF(Teich) 63.6 %    EDV(cubed) 107.2 ml    ESV(cubed) 30.1 ml    EF(cubed) 71.9 % "    LV mass(C)d 174.9 grams    LV mass(C)dI 79.4 grams/m^2    SV(Teich) 66.7 ml    SI(Teich) 30.3 ml/m^2    SV(cubed) 77.1 ml    SI(cubed) 35.0 ml/m^2    Ao root diam 3.6 cm    Ao root area 10.2 cm^2    LA dimension 3.6 cm    asc Aorta Diam 3.9 cm    LA/Ao 1.0     LVOT diam 2.5 cm    LVOT area 4.9 cm^2    LVOT area(traced) 4.9 cm^2    LAd major 4.5 cm    LVLd ap4 7.7 cm    EDV(MOD-sp4) 85.0 ml    LVLs ap4 6.2 cm    ESV(MOD-sp4) 31.0 ml    EF(MOD-sp4) 63.5 %    LVLd ap2 8.4 cm    EDV(MOD-sp2) 105.0 ml    LA volume 43.0 ml    SV(MOD-sp4) 54.0 ml    SI(MOD-sp4) 24.5 ml/m^2    Ao root area (BSA corrected) 1.6     LV Joe Vol (BSA corrected) 38.6 ml/m^2    LV Sys Vol (BSA corrected) 14.1 ml/m^2    LA Volume Index 19.5 ml/m^2    MV E max jorge 62.2 cm/sec    MV A max jorge 82.4 cm/sec    MV E/A 0.75     MV dec time 0.29 sec    Ao pk jorge 144.0 cm/sec    Ao max PG 8.0 mmHg    Ao max PG (full) 3.6 mmHg    Ao V2 mean 94.7 cm/sec    Ao mean PG 4.0 mmHg    Ao mean PG (full) 2.0 mmHg    Ao V2 VTI 31.0 cm    SONY(I,A) 3.7 cm^2    SONY(I,D) 3.7 cm^2    SONY(V,A) 3.6 cm^2    SONY(V,D) 3.6 cm^2    LV V1 max PG 4.4 mmHg    LV V1 mean PG 2.0 mmHg    LV V1 max 105.0 cm/sec    LV V1 mean 67.2 cm/sec    LV V1 VTI 23.2 cm    SV(Ao) 315.5 ml    SI(Ao) 143.3 ml/m^2    SV(LVOT) 113.9 ml    SI(LVOT) 51.7 ml/m^2    PA acc slope 674.0 cm/sec^2    PA acc time 0.14 sec    PI end-d jorge 83.6 cm/sec    TR max jorge 189.0 cm/sec    TR max PG 14.0 mmHg    RVSP(TR) 17.0 mmHg    RAP systole 3.0 mmHg    PA pr(Accel) 16.0 mmHg    Lat E/e'  5.1     Med E/e' 7.1     Lat Peak E' Jorge 12.1 cm/sec    Med Peak E' Jorge 8.8 cm/sec     CV ECHO JONEL - BZI_BMI 27.8 kilograms/m^2     CV ECHO JONEL - BSA(HAYCOCK) 2.2 m^2     CV ECHO JONEL - BZI_METRIC_WEIGHT 95.7 kg     CV ECHO JONEL - BZI_METRIC_HEIGHT 185.4 cm    Avg E/e' ratio 5.95     RV Base 3.20 cm    RV Length 6.00 cm    EF(MOD-bp) 60 %    RVIDd 3.70 cm    Echo EF Estimated 65 %         Assessment/Plan   psa trending  olivia Ellsworth was seen today for hypertension and nasal congestion.    Diagnoses and all orders for this visit:    Essential hypertension  -     TSH  -     T4, Free  -     Comprehensive Metabolic Panel    Elevated PSA  -     Ambulatory Referral to Urology    Subclinical hypothyroidism  -     TSH  -     T4, Free  -     Comprehensive Metabolic Panel      Return in about 3 months (around 9/15/2020).          There are no Patient Instructions on file for this visit.     Kei Jarrett MD    Assessment/Plan

## 2020-07-02 ENCOUNTER — OFFICE VISIT (OUTPATIENT)
Dept: UROLOGY | Facility: CLINIC | Age: 65
End: 2020-07-02

## 2020-07-02 VITALS
TEMPERATURE: 98 F | HEART RATE: 65 BPM | BODY MASS INDEX: 27.96 KG/M2 | DIASTOLIC BLOOD PRESSURE: 74 MMHG | HEIGHT: 73 IN | OXYGEN SATURATION: 99 % | SYSTOLIC BLOOD PRESSURE: 104 MMHG | WEIGHT: 211 LBS

## 2020-07-02 DIAGNOSIS — R97.20 ELEVATED PROSTATE SPECIFIC ANTIGEN (PSA): Primary | ICD-10-CM

## 2020-07-02 LAB
BILIRUB BLD-MCNC: NEGATIVE MG/DL
CLARITY, POC: CLEAR
COLOR UR: YELLOW
GLUCOSE UR STRIP-MCNC: NEGATIVE MG/DL
KETONES UR QL: NEGATIVE
LEUKOCYTE EST, POC: NEGATIVE
NITRITE UR-MCNC: NEGATIVE MG/ML
PH UR: 6 [PH] (ref 5–8)
PROT UR STRIP-MCNC: NEGATIVE MG/DL
RBC # UR STRIP: NEGATIVE /UL
SP GR UR: 1.03 (ref 1–1.03)
UROBILINOGEN UR QL: NORMAL

## 2020-07-02 PROCEDURE — 99204 OFFICE O/P NEW MOD 45 MIN: CPT | Performed by: UROLOGY

## 2020-07-02 PROCEDURE — 81003 URINALYSIS AUTO W/O SCOPE: CPT | Performed by: UROLOGY

## 2020-07-03 LAB
PSA FREE MFR SERPL: 23.8 %
PSA FREE SERPL-MCNC: 0.95 NG/ML
PSA SERPL-MCNC: 4 NG/ML (ref 0–4)

## 2020-07-06 DIAGNOSIS — I10 ESSENTIAL HYPERTENSION: ICD-10-CM

## 2020-07-06 DIAGNOSIS — E78.00 HYPERCHOLESTEROLEMIA: ICD-10-CM

## 2020-07-06 RX ORDER — LISINOPRIL 10 MG/1
TABLET ORAL
Qty: 90 TABLET | Refills: 3 | Status: SHIPPED | OUTPATIENT
Start: 2020-07-06 | End: 2021-06-14

## 2020-07-06 RX ORDER — ATORVASTATIN CALCIUM 10 MG/1
TABLET, FILM COATED ORAL
Qty: 90 TABLET | Refills: 3 | Status: SHIPPED | OUTPATIENT
Start: 2020-07-06 | End: 2021-06-14

## 2020-08-05 DIAGNOSIS — I10 ESSENTIAL HYPERTENSION: ICD-10-CM

## 2020-08-05 RX ORDER — AMLODIPINE BESYLATE 10 MG/1
TABLET ORAL
Qty: 90 TABLET | Refills: 0 | Status: SHIPPED | OUTPATIENT
Start: 2020-08-05 | End: 2020-11-05

## 2020-09-15 LAB
ALBUMIN SERPL-MCNC: 4.6 G/DL (ref 3.5–5.2)
ALBUMIN/GLOB SERPL: 2.2 G/DL
ALP SERPL-CCNC: 122 U/L (ref 39–117)
ALT SERPL-CCNC: 29 U/L (ref 1–41)
AST SERPL-CCNC: 19 U/L (ref 1–40)
BILIRUB SERPL-MCNC: 0.8 MG/DL (ref 0–1.2)
BUN SERPL-MCNC: 13 MG/DL (ref 8–23)
BUN/CREAT SERPL: 13 (ref 7–25)
CALCIUM SERPL-MCNC: 8.9 MG/DL (ref 8.6–10.5)
CHLORIDE SERPL-SCNC: 105 MMOL/L (ref 98–107)
CO2 SERPL-SCNC: 24.9 MMOL/L (ref 22–29)
CREAT SERPL-MCNC: 1 MG/DL (ref 0.76–1.27)
GLOBULIN SER CALC-MCNC: 2.1 GM/DL
GLUCOSE SERPL-MCNC: 108 MG/DL (ref 65–99)
POTASSIUM SERPL-SCNC: 4.2 MMOL/L (ref 3.5–5.2)
PROT SERPL-MCNC: 6.7 G/DL (ref 6–8.5)
SODIUM SERPL-SCNC: 139 MMOL/L (ref 136–145)
T4 FREE SERPL-MCNC: 1.29 NG/DL (ref 0.93–1.7)
TSH SERPL DL<=0.005 MIU/L-ACNC: 2.91 UIU/ML (ref 0.27–4.2)

## 2020-09-17 ENCOUNTER — OFFICE VISIT (OUTPATIENT)
Dept: INTERNAL MEDICINE | Facility: CLINIC | Age: 65
End: 2020-09-17

## 2020-09-17 VITALS
SYSTOLIC BLOOD PRESSURE: 126 MMHG | HEART RATE: 62 BPM | DIASTOLIC BLOOD PRESSURE: 73 MMHG | RESPIRATION RATE: 16 BRPM | WEIGHT: 208.12 LBS | HEIGHT: 73 IN | BODY MASS INDEX: 27.58 KG/M2 | OXYGEN SATURATION: 98 % | TEMPERATURE: 98 F

## 2020-09-17 DIAGNOSIS — Z00.00 ROUTINE GENERAL MEDICAL EXAMINATION AT A HEALTH CARE FACILITY: Primary | ICD-10-CM

## 2020-09-17 PROCEDURE — G0008 ADMIN INFLUENZA VIRUS VAC: HCPCS | Performed by: INTERNAL MEDICINE

## 2020-09-17 PROCEDURE — 99397 PER PM REEVAL EST PAT 65+ YR: CPT | Performed by: INTERNAL MEDICINE

## 2020-09-17 PROCEDURE — G0439 PPPS, SUBSEQ VISIT: HCPCS | Performed by: INTERNAL MEDICINE

## 2020-09-17 PROCEDURE — 90694 VACC AIIV4 NO PRSRV 0.5ML IM: CPT | Performed by: INTERNAL MEDICINE

## 2020-09-17 NOTE — PROGRESS NOTES
QUICK REFERENCE INFORMATION:  The ABCs of the Annual Wellness Visit    Medicare Annual Wellness Visit    Subjective   History of Present Illness    Seng Seymour is a 65 y.o. male who presents for an Annual Wellness Visit. In addition, we addressed the following health issues:htn hypercholest      chronic daily pain 4/5.        PMH, PSH, SocHx, FamHx, Allergies, and Medications: Reviewed and updated.     Outpatient Medications Prior to Visit   Medication Sig Dispense Refill   • amLODIPine (NORVASC) 10 MG tablet TAKE 1 TABLET BY MOUTH EVERY DAY 90 tablet 0   • ASPIRIN LOW DOSE 81 MG EC tablet TAKE 1 TABLET BY MOUTH ONCE DAILY 90 tablet 3   • atorvastatin (LIPITOR) 10 MG tablet TAKE 1 TABLET BY MOUTH ONCE DAILY 90 tablet 3   • fluticasone (FLONASE) 50 MCG/ACT nasal spray 1 spray into the nostril(s) as directed by provider Daily.     • lisinopril (PRINIVIL,ZESTRIL) 10 MG tablet TAKE 1 TABLET BY MOUTH ONCE DAILY 90 tablet 3   • naproxen (NAPROSYN) 500 MG tablet Take 1 tablet by mouth 2 (Two) Times a Day With Meals. (Patient taking differently: Take 500 mg by mouth As Needed.) 60 tablet 5   • traZODone (DESYREL) 50 MG tablet Take 1 tablet by mouth Every Night. (Patient taking differently: Take 50 mg by mouth As Needed.) 30 tablet 4   • doxycycline (MONODOX) 100 MG capsule Take 1 capsule by mouth Every 12 (Twelve) Hours. 20 capsule 0     No facility-administered medications prior to visit.        Patient Active Problem List   Diagnosis   • Ankylosing spondylitis (CMS/HCC)   • Benign prostatic hyperplasia with urinary obstruction   • Chest pain   • Erectile dysfunction of nonorganic origin   • Edema   • Hypercholesterolemia   • Essential hypertension   • Skin lesion   • Neurocardiogenic syncope   • Cobalamin deficiency   • Diverticulosis of large intestine   • Internal hemorrhage       Health Habits:  Dental Exam. up to date  Eye Exam. up to date  No exam data present  Exercise: 7 times/week.  Current exercise  activities include: walking    Health Risk Assessment:  The patient has completed a Health Risk Assessment. This has been reviewed with them and has been scanned into the patient's chart.    Current Medical Providers:  Patient Care Team:  Kei Jarrett MD as PCP - General    The [facility] providers who are involved in the care of this patient are listed above. Additional providers and suppliers are listed below:      Recent Hospitalizations:  No hospitalization(s) within the last year..    Recent Lab Results:  CMP:  Lab Results   Component Value Date     (H) 09/14/2020    BUN 13 09/14/2020    CREATININE 1.00 09/14/2020    EGFRIFNONA 75 09/14/2020    EGFRIFAFRI 91 09/14/2020    BCR 13.0 09/14/2020     09/14/2020    K 4.2 09/14/2020    CO2 24.9 09/14/2020    CALCIUM 8.9 09/14/2020    PROTENTOTREF 6.7 09/14/2020    ALBUMIN 4.60 09/14/2020    LABGLOBREF 2.1 09/14/2020    LABIL2 2.2 09/14/2020    BILITOT 0.8 09/14/2020    ALKPHOS 122 (H) 09/14/2020    AST 19 09/14/2020    ALT 29 09/14/2020       LIPID PANEL:  Lab Results   Component Value Date    CHLPL 126 06/11/2020    TRIG 61 06/11/2020    HDL 44 06/11/2020    VLDL 12.2 06/11/2020    LDL 70 06/11/2020       HbA1c:  No results found for: HGBA1C    URINE MICROALBUMIN:  No results found for: MICROALBUR, POCMALB    PSA:  Lab Results   Component Value Date    PSA 4.580 (H) 09/28/2020       Age-appropriate Screening Schedule:  Refer to the list below for future screening recommendations based on patient's age, sex and/or medical conditions. Orders for these recommended tests are listed in the plan section. The patient has been provided with a written plan.    Health Maintenance   Topic Date Due   • LIPID PANEL  06/11/2021   • TDAP/TD VACCINES (3 - Td) 12/03/2024   • COLONOSCOPY  01/04/2028   • INFLUENZA VACCINE  Completed   • ZOSTER VACCINE  Completed       Depression Screen:   PHQ-2/PHQ-9 Depression Screening 9/17/2020   Little interest or pleasure in  doing things 0   Feeling down, depressed, or hopeless 0   Trouble falling or staying asleep, or sleeping too much 0   Feeling tired or having little energy 0   Poor appetite or overeating 0   Feeling bad about yourself - or that you are a failure or have let yourself or your family down 0   Trouble concentrating on things, such as reading the newspaper or watching television 0   Moving or speaking so slowly that other people could have noticed. Or the opposite - being so fidgety or restless that you have been moving around a lot more than usual 0   Total Score 0         Functional and Cognitive Screening:  Functional & Cognitive Status 9/17/2020   Do you have difficulty preparing food and eating? No   Do you have difficulty bathing yourself, getting dressed or grooming yourself? No   Do you have difficulty using the toilet? No   Do you have difficulty moving around from place to place? No   Do you have trouble with steps or getting out of a bed or a chair? No   Current Diet Well Balanced Diet   Dental Exam Up to date   Eye Exam Up to date   Exercise (times per week) 7 times per week   Current Exercise Activities Include Walking   Do you need help using the phone?  No   Are you deaf or do you have serious difficulty hearing?  No   Do you need help with transportation? No   Do you need help shopping? No   Do you need help preparing meals?  No   Do you need help with housework?  No   Do you need help with laundry? No   Do you need help taking your medications? No   Do you need help managing money? No   Have you felt unusual stress, anger or loneliness in the last month? No   Who do you live with? Alone   If you need help, do you have trouble finding someone available to you? No   Do you have difficulty concentrating, remembering or making decisions? No       Does the patient have evidence of cognitive impairment? No    Advanced Care Planning:  ACP discussion was held with the patient during this visit. Patient has an  advance directive in EMR which is still valid.     Identification of Risk Factors:  Risk factors include: Advance Directive Discussion  Immunizations Discussed/Encouraged (specific immunizations; Influenza and none ).    Review of Systems    Compared to one year ago, the patient feels his physical health is the same.  Compared to one year ago, the patient feels his mental health is the same.    Objective     Physical Exam  Constitutional:       General: He is not in acute distress.     Appearance: He is normal weight. He is not ill-appearing, toxic-appearing or diaphoretic.   HENT:      Head: Normocephalic and atraumatic.      Right Ear: Tympanic membrane, ear canal and external ear normal.      Nose: Nose normal. No congestion.      Mouth/Throat:      Mouth: Mucous membranes are moist.      Pharynx: Oropharynx is clear. No oropharyngeal exudate or posterior oropharyngeal erythema.   Eyes:      General: No scleral icterus.        Right eye: No discharge.         Left eye: No discharge.      Extraocular Movements: Extraocular movements intact.      Pupils: Pupils are equal, round, and reactive to light.   Neck:      Musculoskeletal: Normal range of motion and neck supple.   Cardiovascular:      Rate and Rhythm: Normal rate.      Pulses: Normal pulses.      Heart sounds: No murmur.   Pulmonary:      Effort: Pulmonary effort is normal.      Breath sounds: Normal breath sounds.   Abdominal:      General: Abdomen is flat. There is no distension.      Palpations: Abdomen is soft. There is no mass.      Tenderness: There is no abdominal tenderness.      Hernia: No hernia is present.   Musculoskeletal: Normal range of motion.   Skin:     General: Skin is warm.      Capillary Refill: Capillary refill takes less than 2 seconds.      Coloration: Skin is not jaundiced or pale.      Findings: No bruising or erythema.   Neurological:      General: No focal deficit present.      Mental Status: He is alert.      Cranial Nerves: No  "cranial nerve deficit.   Psychiatric:         Mood and Affect: Mood normal.         Behavior: Behavior normal.         Vitals:    09/17/20 1452   BP: 126/73   BP Location: Left arm   Patient Position: Sitting   Cuff Size: Large Adult   Pulse: 62   Resp: 16   Temp: 98 °F (36.7 °C)   TempSrc: Temporal   SpO2: 98%   Weight: 94.4 kg (208 lb 1.9 oz)   Height: 185.4 cm (73\")       Body mass index is 27.46 kg/m².  Discussed the patient's BMI with him. The BMI is in the acceptable range.    Assessment/Plan   Patient Self-Management and Personalized Health Advice  The patient has been provided with information about: diet, exercise, weight management and fall prevention and preventive services including:   · Annual Wellness Visit (AWV)  · Influenza Vaccine and Administration.    Visit Diagnoses:    ICD-10-CM ICD-9-CM   1. Routine general medical examination at a health care facility  Z00.00 V70.0       Orders Placed This Encounter   Procedures   • Fluad Quad 65+ yrs ([id number])       Outpatient Encounter Medications as of 9/17/2020   Medication Sig Dispense Refill   • amLODIPine (NORVASC) 10 MG tablet TAKE 1 TABLET BY MOUTH EVERY DAY 90 tablet 0   • ASPIRIN LOW DOSE 81 MG EC tablet TAKE 1 TABLET BY MOUTH ONCE DAILY 90 tablet 3   • atorvastatin (LIPITOR) 10 MG tablet TAKE 1 TABLET BY MOUTH ONCE DAILY 90 tablet 3   • fluticasone (FLONASE) 50 MCG/ACT nasal spray 1 spray into the nostril(s) as directed by provider Daily.     • lisinopril (PRINIVIL,ZESTRIL) 10 MG tablet TAKE 1 TABLET BY MOUTH ONCE DAILY 90 tablet 3   • naproxen (NAPROSYN) 500 MG tablet Take 1 tablet by mouth 2 (Two) Times a Day With Meals. (Patient taking differently: Take 500 mg by mouth As Needed.) 60 tablet 5   • traZODone (DESYREL) 50 MG tablet Take 1 tablet by mouth Every Night. (Patient taking differently: Take 50 mg by mouth As Needed.) 30 tablet 4   • [DISCONTINUED] doxycycline (MONODOX) 100 MG capsule Take 1 capsule by mouth Every 12 (Twelve) Hours. 20 " capsule 0     No facility-administered encounter medications on file as of 9/17/2020.        Reviewed use of high risk medication in the elderly: yes  Reviewed for potential of harmful drug interactions in the elderly: yes    Follow Up:  Return in about 6 months (around 3/17/2021).     An After Visit Summary and PPPS with all of these plans were given to the patient.             Seng was seen today for hypertension.    Diagnoses and all orders for this visit:    Routine general medical examination at a health care facility    Other orders  -     Fluad Quad 65+ yrs ([id number])

## 2020-09-17 NOTE — PROGRESS NOTES
"Subjective     Patient ID: Seng Seymour is a 65 y.o. male. Patient is here for management of multiple medical problems.     Chief Complaint   Patient presents with   • Hypertension     follow-up     History of Present Illness       Hypertension.    stable and tolerating meds.              The following portions of the patient's history were reviewed and updated as appropriate: allergies, current medications, past family history, past medical history, past social history, past surgical history and problem list.    Review of Systems   Constitutional: Negative for fatigue.   Respiratory: Negative for shortness of breath and stridor.    Musculoskeletal: Negative for gait problem, joint swelling and myalgias.   Psychiatric/Behavioral: Negative for self-injury and sleep disturbance. The patient is not nervous/anxious.    All other systems reviewed and are negative.      Current Outpatient Medications:   •  amLODIPine (NORVASC) 10 MG tablet, TAKE 1 TABLET BY MOUTH EVERY DAY, Disp: 90 tablet, Rfl: 0  •  ASPIRIN LOW DOSE 81 MG EC tablet, TAKE 1 TABLET BY MOUTH ONCE DAILY, Disp: 90 tablet, Rfl: 3  •  atorvastatin (LIPITOR) 10 MG tablet, TAKE 1 TABLET BY MOUTH ONCE DAILY, Disp: 90 tablet, Rfl: 3  •  fluticasone (FLONASE) 50 MCG/ACT nasal spray, 1 spray into the nostril(s) as directed by provider Daily., Disp: , Rfl:   •  lisinopril (PRINIVIL,ZESTRIL) 10 MG tablet, TAKE 1 TABLET BY MOUTH ONCE DAILY, Disp: 90 tablet, Rfl: 3  •  naproxen (NAPROSYN) 500 MG tablet, Take 1 tablet by mouth 2 (Two) Times a Day With Meals. (Patient taking differently: Take 500 mg by mouth As Needed.), Disp: 60 tablet, Rfl: 5  •  traZODone (DESYREL) 50 MG tablet, Take 1 tablet by mouth Every Night. (Patient taking differently: Take 50 mg by mouth As Needed.), Disp: 30 tablet, Rfl: 4    Objective      Blood pressure 126/73, pulse 62, temperature 98 °F (36.7 °C), temperature source Temporal, resp. rate 16, height 185.4 cm (73\"), weight 94.4 kg (208 " lb 1.9 oz), SpO2 98 %.    Physical Exam     General Appearance:    Alert, cooperative, no distress, appears stated age   Head:    Normocephalic, without obvious abnormality, atraumatic   Eyes:    PERRL, conjunctiva/corneas clear, EOM's intact   Ears:    Normal TM's and external ear canals, both ears   Nose:   Nares normal, septum midline, mucosa normal, no drainage   or sinus tenderness   Throat:   Lips, mucosa, and tongue normal; teeth and gums normal   Neck:   Supple, symmetrical, trachea midline, no adenopathy;        thyroid:  No enlargement/tenderness/nodules; no carotid    bruit or JVD   Back:     Symmetric, no curvature, ROM normal, no CVA tenderness   Lungs:     Clear to auscultation bilaterally, respirations unlabored   Chest wall:    No tenderness or deformity   Heart:    Regular rate and rhythm, S1 and S2 normal, no murmur,        rub or gallop   Abdomen:     Soft, non-tender, bowel sounds active all four quadrants,     no masses, no organomegaly   Extremities:   Extremities normal, atraumatic, no cyanosis or edema   Pulses:   2+ and symmetric all extremities   Skin:   Skin color, texture, turgor normal, no rashes or lesions   Lymph nodes:   Cervical, supraclavicular, and axillary nodes normal   Neurologic:   CNII-XII intact. Normal strength, sensation and reflexes       throughout      Results for orders placed or performed in visit on 07/02/20   PSA, Total & Free    Specimen: Blood   Result Value Ref Range    PSA 4.0 0.0 - 4.0 ng/mL    PSA, Free 0.95 N/A ng/mL    PSA, Free % 23.8 %   POC Urinalysis Dipstick, Automated    Specimen: Urine   Result Value Ref Range    Color Yellow Yellow, Straw, Dark Yellow, Jane    Clarity, UA Clear Clear    Specific Gravity  1.030 1.005 - 1.030    pH, Urine 6.0 5.0 - 8.0    Leukocytes Negative Negative    Nitrite, UA Negative Negative    Protein, POC Negative Negative mg/dL    Glucose, UA Negative Negative, 1000 mg/dL (3+) mg/dL    Ketones, UA Negative Negative     Urobilinogen, UA Normal Normal    Bilirubin Negative Negative    Blood, UA Negative Negative         Assessment/Plan     Labs look great.            Seng was seen today for hypertension.    Diagnoses and all orders for this visit:    Essential hypertension    Hypercholesterolemia    Routine general medical examination at a health care facility    Other orders  -     Fluad Quad 65+ yrs ([id number])      Return in about 6 months (around 3/17/2021).          There are no Patient Instructions on file for this visit.     Kei Jarrett MD    Assessment/Plan

## 2020-09-28 ENCOUNTER — LAB (OUTPATIENT)
Dept: UROLOGY | Facility: CLINIC | Age: 65
End: 2020-09-28

## 2020-09-28 DIAGNOSIS — R97.20 ELEVATED PROSTATE SPECIFIC ANTIGEN (PSA): Primary | ICD-10-CM

## 2020-09-29 LAB — PSA SERPL-MCNC: 4.58 NG/ML (ref 0–4)

## 2020-10-05 ENCOUNTER — OFFICE VISIT (OUTPATIENT)
Dept: UROLOGY | Facility: CLINIC | Age: 65
End: 2020-10-05

## 2020-10-05 VITALS
WEIGHT: 210 LBS | HEART RATE: 67 BPM | DIASTOLIC BLOOD PRESSURE: 92 MMHG | OXYGEN SATURATION: 99 % | RESPIRATION RATE: 18 BRPM | TEMPERATURE: 98.2 F | SYSTOLIC BLOOD PRESSURE: 126 MMHG | HEIGHT: 73 IN | BODY MASS INDEX: 27.83 KG/M2

## 2020-10-05 DIAGNOSIS — N40.1 BPH WITH URINARY OBSTRUCTION: ICD-10-CM

## 2020-10-05 DIAGNOSIS — R97.20 ELEVATED PROSTATE SPECIFIC ANTIGEN (PSA): Primary | ICD-10-CM

## 2020-10-05 DIAGNOSIS — N13.8 BPH WITH URINARY OBSTRUCTION: ICD-10-CM

## 2020-10-05 LAB
BILIRUB BLD-MCNC: NEGATIVE MG/DL
CLARITY, POC: CLEAR
COLOR UR: NORMAL
GLUCOSE UR STRIP-MCNC: NEGATIVE MG/DL
KETONES UR QL: NEGATIVE
LEUKOCYTE EST, POC: NEGATIVE
NITRITE UR-MCNC: NEGATIVE MG/ML
PH UR: 6 [PH] (ref 5–8)
PROT UR STRIP-MCNC: NEGATIVE MG/DL
RBC # UR STRIP: NEGATIVE /UL
SP GR UR: 1.02 (ref 1–1.03)
UROBILINOGEN UR QL: NORMAL

## 2020-10-05 PROCEDURE — 99213 OFFICE O/P EST LOW 20 MIN: CPT | Performed by: UROLOGY

## 2020-10-05 PROCEDURE — 81003 URINALYSIS AUTO W/O SCOPE: CPT | Performed by: UROLOGY

## 2020-10-05 NOTE — PROGRESS NOTES
Chief Complaint  Follow-up (for elevated PSA)        GENNARO Seymour is a 65 y.o. male who turns today for close follow-up of his prostate in light of his elevated PSA.  He was originally known to have a markedly enlarged prostate and put on Proscar to improve symptoms and while he was taking his PSA was always normal.  Since stopping it there is been a gradual rise and it was most recently measured at 4.58.  Actually up to 4.5 is normal for his age.  He is always had a benign prostate to digital rectal exam.    There were no vitals filed for this visit.    Past Medical History  Past Medical History:   Diagnosis Date   • Hyperlipidemia    • Hypertension    • Parkinson disease (CMS/HCC)        Past Surgical History  Past Surgical History:   Procedure Laterality Date   • HERNIA REPAIR     • NASAL SEPTAL RECONSTRUCTION         Medications  has a current medication list which includes the following prescription(s): amlodipine, aspirin low dose, atorvastatin, fluticasone, lisinopril, naproxen, and trazodone.      Allergies  No Known Allergies    Social History  Social History     Socioeconomic History   • Marital status: Single     Spouse name: Not on file   • Number of children: Not on file   • Years of education: Not on file   • Highest education level: Not on file   Tobacco Use   • Smoking status: Never Smoker   • Smokeless tobacco: Never Used   Substance and Sexual Activity   • Alcohol use: No     Frequency: Never   • Drug use: No       Family History  He has no family history of bladder or kidney cancer  He has no family history of kidney stones      AUA Symptom Score:      Review of Systems  Review of Systems   Constitutional: Negative for activity change, appetite change, chills, fatigue, fever, unexpected weight gain and unexpected weight loss.   Respiratory: Negative for apnea, cough, chest tightness, shortness of breath, wheezing and stridor.    Cardiovascular: Negative for chest pain, palpitations and leg swelling.    Gastrointestinal: Negative for abdominal distention, abdominal pain, anal bleeding, blood in stool, constipation, diarrhea, nausea, rectal pain, vomiting, GERD and indigestion.   Genitourinary: Positive for frequency. Negative for decreased libido, decreased urine volume, difficulty urinating, discharge, dysuria, flank pain, genital sores, hematuria, nocturia, penile pain, erectile dysfunction, penile swelling, scrotal swelling, testicular pain, urgency and urinary incontinence.   Musculoskeletal: Negative for back pain and joint swelling.   Neurological: Negative for tremors, seizures, speech difficulty, weakness and numbness.   Psychiatric/Behavioral: Negative for agitation, decreased concentration, sleep disturbance, depressed mood and stress. The patient is not nervous/anxious.        Physical Exam  Physical Exam  Vitals signs reviewed.   Constitutional:       Appearance: He is well-developed.   HENT:      Head: Normocephalic and atraumatic.   Neck:      Musculoskeletal: Normal range of motion.   Pulmonary:      Effort: Pulmonary effort is normal. No respiratory distress.   Abdominal:      General: There is no distension.      Palpations: Abdomen is soft. There is no mass.      Tenderness: There is no abdominal tenderness.      Hernia: No hernia is present.   Genitourinary:     Prostate: Normal.      Rectum: Normal.   Musculoskeletal: Normal range of motion.   Lymphadenopathy:      Cervical: No cervical adenopathy.   Skin:     General: Skin is warm and dry.   Neurological:      Mental Status: He is alert and oriented to person, place, and time.   Psychiatric:         Behavior: Behavior normal.         Labs Recent and today in the office:  Results for orders placed or performed in visit on 09/28/20   PSA DIAGNOSTIC    Specimen: Blood   Result Value Ref Range    PSA 4.580 (H) 0.000 - 4.000 ng/mL         Assessment & Plan  Elevated PSA: The patient has about a 20% chance of prostate cancer and we have discussed  proceeding with a biopsy at this point or close surveillance.  He agrees with the second option and I have encouraged him to eat more of a plant-based heart healthy diet to actually reduce the risk of getting prostate cancer.  He will return in 3 months with a total to free PSA and if his risk is increased I would recommend prostate biopsy.

## 2020-10-10 ENCOUNTER — RESULTS ENCOUNTER (OUTPATIENT)
Dept: UROLOGY | Facility: CLINIC | Age: 65
End: 2020-10-10

## 2020-10-10 DIAGNOSIS — R97.20 ELEVATED PROSTATE SPECIFIC ANTIGEN (PSA): ICD-10-CM

## 2020-11-03 DIAGNOSIS — I10 ESSENTIAL HYPERTENSION: ICD-10-CM

## 2020-11-05 RX ORDER — AMLODIPINE BESYLATE 10 MG/1
TABLET ORAL
Qty: 90 TABLET | Refills: 3 | Status: SHIPPED | OUTPATIENT
Start: 2020-11-05 | End: 2021-09-13

## 2020-12-29 ENCOUNTER — LAB (OUTPATIENT)
Dept: UROLOGY | Facility: CLINIC | Age: 65
End: 2020-12-29

## 2020-12-30 LAB
PSA FREE MFR SERPL: 29.3 %
PSA FREE SERPL-MCNC: 1.29 NG/ML
PSA SERPL-MCNC: 4.4 NG/ML (ref 0–4)

## 2021-01-05 ENCOUNTER — OFFICE VISIT (OUTPATIENT)
Dept: UROLOGY | Facility: CLINIC | Age: 66
End: 2021-01-05

## 2021-01-05 VITALS
DIASTOLIC BLOOD PRESSURE: 64 MMHG | SYSTOLIC BLOOD PRESSURE: 124 MMHG | BODY MASS INDEX: 27.83 KG/M2 | TEMPERATURE: 97.1 F | OXYGEN SATURATION: 98 % | HEART RATE: 78 BPM | WEIGHT: 210 LBS | RESPIRATION RATE: 20 BRPM | HEIGHT: 73 IN

## 2021-01-05 DIAGNOSIS — N40.1 BPH WITH URINARY OBSTRUCTION: ICD-10-CM

## 2021-01-05 DIAGNOSIS — N13.8 BPH WITH URINARY OBSTRUCTION: ICD-10-CM

## 2021-01-05 DIAGNOSIS — R97.20 ELEVATED PROSTATE SPECIFIC ANTIGEN (PSA): Primary | ICD-10-CM

## 2021-01-05 LAB
BILIRUB BLD-MCNC: NEGATIVE MG/DL
CLARITY, POC: CLEAR
COLOR UR: YELLOW
GLUCOSE UR STRIP-MCNC: NEGATIVE MG/DL
KETONES UR QL: NEGATIVE
LEUKOCYTE EST, POC: NEGATIVE
NITRITE UR-MCNC: NEGATIVE MG/ML
PH UR: 5.5 [PH] (ref 5–8)
PROT UR STRIP-MCNC: NEGATIVE MG/DL
RBC # UR STRIP: NEGATIVE /UL
SP GR UR: 1.02 (ref 1–1.03)
UROBILINOGEN UR QL: NORMAL

## 2021-01-05 PROCEDURE — 99213 OFFICE O/P EST LOW 20 MIN: CPT | Performed by: UROLOGY

## 2021-01-05 PROCEDURE — 81003 URINALYSIS AUTO W/O SCOPE: CPT | Performed by: UROLOGY

## 2021-01-05 NOTE — PROGRESS NOTES
Chief Complaint  No chief complaint on file.    Elevated PSA    HPI  Dawn is a 65 y.o. male who returns today for checkup in light of an elevated PSA that is thought to be due to a markedly enlarged prostate and associated with a benign PSA density.  He was placed on Proscar to shrink the gland and any symptoms of outlet obstruction have improved and his PSA is down.  He is always had a benign prostate on digital rectal exam so no biopsy has been recommended.  Actually he states he quit taking his finasteride months ago at the suggestion of his primary care provider and he returns today with a PSA of 4.4.  I am surprised it did not increase more and this is still normal for his age which would be 4.5.    He also has a problem with erectile dysfunction and has taken Cialis in the past.  Currently he is doing pretty well with that as well.  There were no vitals filed for this visit.    Past Medical History  Past Medical History:   Diagnosis Date   • Hyperlipidemia    • Hypertension    • Parkinson disease (CMS/HCC)        Past Surgical History  Past Surgical History:   Procedure Laterality Date   • HERNIA REPAIR     • NASAL SEPTAL RECONSTRUCTION         Medications  has a current medication list which includes the following prescription(s): amlodipine, aspirin low dose, atorvastatin, fluticasone, lisinopril, naproxen, and trazodone.      Allergies  No Known Allergies    Social History  Social History     Socioeconomic History   • Marital status: Single     Spouse name: Not on file   • Number of children: Not on file   • Years of education: Not on file   • Highest education level: Not on file   Tobacco Use   • Smoking status: Never Smoker   • Smokeless tobacco: Never Used   Substance and Sexual Activity   • Alcohol use: No     Frequency: Never   • Drug use: No       Family History  He has no family history of bladder or kidney cancer  He has no family history of kidney stones      AUA Symptom Score:      Review of  Systems  Review of Systems   Constitutional: Negative for activity change, appetite change, chills, fatigue, fever, unexpected weight gain and unexpected weight loss.   Respiratory: Negative for apnea, cough, chest tightness, shortness of breath, wheezing and stridor.    Cardiovascular: Negative for chest pain, palpitations and leg swelling.   Gastrointestinal: Negative for abdominal distention, abdominal pain, anal bleeding, blood in stool, constipation, diarrhea, nausea, rectal pain, vomiting, GERD and indigestion.   Genitourinary: Negative for decreased libido, decreased urine volume, difficulty urinating, discharge, dysuria, flank pain, frequency, genital sores, hematuria, nocturia, penile pain, erectile dysfunction, penile swelling, scrotal swelling, testicular pain, urgency and urinary incontinence.   Musculoskeletal: Negative for back pain and joint swelling.   Neurological: Negative for tremors, seizures, speech difficulty, weakness and numbness.   Psychiatric/Behavioral: Negative for agitation, decreased concentration, sleep disturbance, depressed mood and stress. The patient is not nervous/anxious.        Physical Exam  Physical Exam  Vitals signs reviewed.   Constitutional:       Appearance: He is well-developed.   HENT:      Head: Normocephalic and atraumatic.   Neck:      Musculoskeletal: Normal range of motion.   Pulmonary:      Effort: Pulmonary effort is normal. No respiratory distress.   Abdominal:      General: There is no distension.      Palpations: Abdomen is soft. There is no mass.      Tenderness: There is no abdominal tenderness.      Hernia: No hernia is present.   Musculoskeletal: Normal range of motion.   Lymphadenopathy:      Cervical: No cervical adenopathy.   Skin:     General: Skin is warm and dry.   Neurological:      Mental Status: He is alert and oriented to person, place, and time.   Psychiatric:         Behavior: Behavior normal.         Labs Recent and today in the  office:  Results for orders placed or performed in visit on 10/10/20   PSA, Total & Free    Specimen: Blood   Result Value Ref Range    PSA 4.4 (H) 0.0 - 4.0 ng/mL    PSA, Free 1.29 N/A ng/mL    PSA, Free % 29.3 %         Assessment & Plan  BPH with elevated PSA: Currently he is voiding without difficulty off all medications and has the PSA as above suggesting a very low risk of prostate cancer.  He is encouraged to eat more of a plant-based heart healthy diet to reduce the risk of getting prostate cancer.  He likes to eat a lot of cheese and is trying to cut back on the red meat like his favorite hamburgers.  He is encouraged to watch [employer] and continue regular surveillance on his prostate.

## 2021-03-10 ENCOUNTER — OFFICE VISIT (OUTPATIENT)
Dept: CARDIOLOGY | Facility: CLINIC | Age: 66
End: 2021-03-10

## 2021-03-10 VITALS
SYSTOLIC BLOOD PRESSURE: 136 MMHG | HEART RATE: 60 BPM | TEMPERATURE: 96.8 F | BODY MASS INDEX: 27.94 KG/M2 | DIASTOLIC BLOOD PRESSURE: 70 MMHG | OXYGEN SATURATION: 97 % | WEIGHT: 210.8 LBS | HEIGHT: 73 IN

## 2021-03-10 DIAGNOSIS — R55 NEUROCARDIOGENIC SYNCOPE: Primary | ICD-10-CM

## 2021-03-10 PROCEDURE — 99213 OFFICE O/P EST LOW 20 MIN: CPT | Performed by: INTERNAL MEDICINE

## 2021-03-10 RX ORDER — DIPHENOXYLATE HYDROCHLORIDE AND ATROPINE SULFATE 2.5; .025 MG/1; MG/1
TABLET ORAL DAILY
COMMUNITY

## 2021-03-10 RX ORDER — PHENOL 1.4 %
600 AEROSOL, SPRAY (ML) MUCOUS MEMBRANE DAILY
COMMUNITY

## 2021-03-10 RX ORDER — MULTIVIT WITH MINERALS/LUTEIN
250 TABLET ORAL DAILY
COMMUNITY

## 2021-03-10 NOTE — ASSESSMENT & PLAN NOTE
· No episodes since 2019  · Discussed adequate hydration, particularly when hot outside  · Discussed isometric counter maneuvers

## 2021-03-10 NOTE — PROGRESS NOTES
"[facility]      Identification: Seng Seymour is a 65 y.o. male who resides in [place], [place].    Reason for visit:  · Neurocardiogenic syncope      Subjective      Seng Seymour presents to [facility] for followup.    History of Present Illness  Mr. Seymour reports no cardiac symptoms since his last visit. He has not had any vasovagal syncope, nausea, or dizziness. He is working to stay hydrated and avoid overheating. He had COVID in 11/2020 though he had no hospitalization or residual symptoms. He plans to join a gym to increase his exercise once it is safe to do so and wants to make sure he can do so safely.     Review of Systems   All other systems reviewed and are negative.      Objective     /70 (BP Location: Right arm, Patient Position: Sitting)   Pulse 60   Temp 96.8 °F (36 °C)   Ht 185.4 cm (73\")   Wt 95.6 kg (210 lb 12.8 oz)   SpO2 97%   BMI 27.81 kg/m²       Constitutional:       Appearance: Well-developed.   Eyes:      General: No scleral icterus.     Pupils: Pupils are equal, round, and reactive to light.   HENT:      Head: Normocephalic and atraumatic.   Neck:      Thyroid: No thyromegaly.      Vascular: No carotid bruit or JVD.   Pulmonary:      Effort: Pulmonary effort is normal.      Breath sounds: Normal breath sounds.   Cardiovascular:      Normal rate. Regular rhythm.      Murmurs: There is no murmur.      No gallop.   Abdominal:      Palpations: Abdomen is soft. There is no abdominal mass.      Tenderness: There is no abdominal tenderness.   Musculoskeletal:      Extremities: No clubbing present.Skin:     General: Skin is warm and dry. There is no cyanosis.   Neurological:      Mental Status: Alert and oriented to person, place, and time.   Psychiatric:         Behavior: Behavior normal.         Result Review :    Lab Results   Component Value Date     (H) 09/14/2020    BUN 13 09/14/2020    CREATININE 1.00 09/14/2020    EGFRIFNONA 75 " 09/14/2020    EGFRIFAFRI 91 09/14/2020    BCR 13.0 09/14/2020    K 4.2 09/14/2020    CO2 24.9 09/14/2020    CALCIUM 8.9 09/14/2020    PROTENTOTREF 6.7 09/14/2020    ALBUMIN 4.60 09/14/2020    LABIL2 2.2 09/14/2020    AST 19 09/14/2020    ALT 29 09/14/2020     Lab Results   Component Value Date    WBC 10.29 06/11/2020    HGB 15.0 06/11/2020    HCT 44.8 06/11/2020    MCV 84.5 06/11/2020     06/11/2020     Lab Results   Component Value Date    CHLPL 126 06/11/2020    TRIG 61 06/11/2020    HDL 44 06/11/2020    LDL 70 06/11/2020                    Problem List Items Addressed This Visit        Cardiology Problems    Neurocardiogenic syncope - Primary (Chronic)    Overview     · Infrequent episodes of syncope preceded by nausea  · Echocardiogram (08/15/2014): LVEF 65%. No valvular abnormalities.   · Episode of syncope preceded by nausea while outside on a hot day, summer 2019  · EKG (12/6/2019): Normal  · Echo (3/6/2020): Normal LVEF.  No valvular abnormalities         Current Assessment & Plan     · No episodes since 2019  · Discussed adequate hydration, particularly when hot outside  · Discussed isometric counter maneuvers                  · Emphasized adequate hydration  · Discussed isometric counter maneuvers        Follow-up   Return if symptoms worsen or fail to improve.        Frederic Jain MD, [facility], [facility]  3/10/2021     Scribed for Robi Jain IV, MD by YG LUCAS.  03/10/21   15:36 EST    I have personally performed the services described in this document as scribed by the above individual, and it is both accurate and complete.  Robi Jain IV, MD  3/10/2021  16:45 EST

## 2021-03-15 DIAGNOSIS — Z12.5 PROSTATE CANCER SCREENING: Primary | ICD-10-CM

## 2021-03-15 DIAGNOSIS — E78.00 HYPERCHOLESTEREMIA: ICD-10-CM

## 2021-03-16 ENCOUNTER — IMMUNIZATION (OUTPATIENT)
Dept: VACCINE CLINIC | Facility: HOSPITAL | Age: 66
End: 2021-03-16

## 2021-03-16 PROCEDURE — 0001A: CPT | Performed by: INTERNAL MEDICINE

## 2021-03-16 PROCEDURE — 91300 HC SARSCOV02 VAC 30MCG/0.3ML IM: CPT | Performed by: INTERNAL MEDICINE

## 2021-03-30 LAB
ALBUMIN SERPL-MCNC: 4.2 G/DL (ref 3.5–5.2)
ALBUMIN/GLOB SERPL: 1.8 G/DL
ALP SERPL-CCNC: 100 U/L (ref 39–117)
ALT SERPL-CCNC: 31 U/L (ref 1–41)
AST SERPL-CCNC: 21 U/L (ref 1–40)
BASOPHILS # BLD AUTO: 0.06 10*3/MM3 (ref 0–0.2)
BASOPHILS NFR BLD AUTO: 0.6 % (ref 0–1.5)
BILIRUB SERPL-MCNC: 0.7 MG/DL (ref 0–1.2)
BUN SERPL-MCNC: 15 MG/DL (ref 8–23)
BUN/CREAT SERPL: 15 (ref 7–25)
CALCIUM SERPL-MCNC: 8.8 MG/DL (ref 8.6–10.5)
CHLORIDE SERPL-SCNC: 106 MMOL/L (ref 98–107)
CHOLEST SERPL-MCNC: 124 MG/DL (ref 0–200)
CO2 SERPL-SCNC: 24 MMOL/L (ref 22–29)
CREAT SERPL-MCNC: 1 MG/DL (ref 0.76–1.27)
EOSINOPHIL # BLD AUTO: 0.17 10*3/MM3 (ref 0–0.4)
EOSINOPHIL NFR BLD AUTO: 1.7 % (ref 0.3–6.2)
ERYTHROCYTE [DISTWIDTH] IN BLOOD BY AUTOMATED COUNT: 12.7 % (ref 12.3–15.4)
GLOBULIN SER CALC-MCNC: 2.3 GM/DL
GLUCOSE SERPL-MCNC: 106 MG/DL (ref 65–99)
HCT VFR BLD AUTO: 45.8 % (ref 37.5–51)
HDLC SERPL-MCNC: 46 MG/DL (ref 40–60)
HGB BLD-MCNC: 15.5 G/DL (ref 13–17.7)
IMM GRANULOCYTES # BLD AUTO: 0.03 10*3/MM3 (ref 0–0.05)
IMM GRANULOCYTES NFR BLD AUTO: 0.3 % (ref 0–0.5)
LDLC SERPL CALC-MCNC: 65 MG/DL (ref 0–100)
LYMPHOCYTES # BLD AUTO: 1.5 10*3/MM3 (ref 0.7–3.1)
LYMPHOCYTES NFR BLD AUTO: 14.7 % (ref 19.6–45.3)
MCH RBC QN AUTO: 28.6 PG (ref 26.6–33)
MCHC RBC AUTO-ENTMCNC: 33.8 G/DL (ref 31.5–35.7)
MCV RBC AUTO: 84.5 FL (ref 79–97)
MONOCYTES # BLD AUTO: 0.61 10*3/MM3 (ref 0.1–0.9)
MONOCYTES NFR BLD AUTO: 6 % (ref 5–12)
NEUTROPHILS # BLD AUTO: 7.84 10*3/MM3 (ref 1.7–7)
NEUTROPHILS NFR BLD AUTO: 76.7 % (ref 42.7–76)
NRBC BLD AUTO-RTO: 0 /100 WBC (ref 0–0.2)
PLATELET # BLD AUTO: 289 10*3/MM3 (ref 140–450)
POTASSIUM SERPL-SCNC: 4.2 MMOL/L (ref 3.5–5.2)
PROT SERPL-MCNC: 6.5 G/DL (ref 6–8.5)
PSA SERPL-MCNC: 4.38 NG/ML (ref 0–4)
RBC # BLD AUTO: 5.42 10*6/MM3 (ref 4.14–5.8)
SODIUM SERPL-SCNC: 140 MMOL/L (ref 136–145)
T4 FREE SERPL-MCNC: 1.23 NG/DL (ref 0.93–1.7)
TRIGL SERPL-MCNC: 60 MG/DL (ref 0–150)
TSH SERPL DL<=0.005 MIU/L-ACNC: 2.32 UIU/ML (ref 0.27–4.2)
VIT B12 SERPL-MCNC: 684 PG/ML (ref 211–946)
VLDLC SERPL CALC-MCNC: 13 MG/DL (ref 5–40)
WBC # BLD AUTO: 10.21 10*3/MM3 (ref 3.4–10.8)

## 2021-04-01 ENCOUNTER — OFFICE VISIT (OUTPATIENT)
Dept: INTERNAL MEDICINE | Facility: CLINIC | Age: 66
End: 2021-04-01

## 2021-04-01 VITALS
BODY MASS INDEX: 28.63 KG/M2 | OXYGEN SATURATION: 98 % | SYSTOLIC BLOOD PRESSURE: 124 MMHG | HEIGHT: 73 IN | TEMPERATURE: 97.5 F | WEIGHT: 216 LBS | HEART RATE: 68 BPM | DIASTOLIC BLOOD PRESSURE: 78 MMHG | RESPIRATION RATE: 16 BRPM

## 2021-04-01 DIAGNOSIS — N40.1 BENIGN PROSTATIC HYPERPLASIA WITH NOCTURIA: ICD-10-CM

## 2021-04-01 DIAGNOSIS — I10 ESSENTIAL HYPERTENSION: Primary | ICD-10-CM

## 2021-04-01 DIAGNOSIS — R35.1 BENIGN PROSTATIC HYPERPLASIA WITH NOCTURIA: ICD-10-CM

## 2021-04-01 PROCEDURE — 99214 OFFICE O/P EST MOD 30 MIN: CPT | Performed by: INTERNAL MEDICINE

## 2021-04-01 RX ORDER — TAMSULOSIN HYDROCHLORIDE 0.4 MG/1
1 CAPSULE ORAL NIGHTLY
Qty: 30 CAPSULE | Refills: 11 | Status: SHIPPED | OUTPATIENT
Start: 2021-04-01 | End: 2022-05-24

## 2021-04-01 NOTE — PROGRESS NOTES
Subjective     Patient ID: Seng Seymour is a 65 y.o. male. Patient is here for management of multiple medical problems.     Chief Complaint   Patient presents with   • Hypertension     History of Present Illness   htn    Stable. Wt is up.        The following portions of the patient's history were reviewed and updated as appropriate: allergies, current medications, past family history, past medical history, past social history, past surgical history and problem list.    Review of Systems   Constitutional: Negative for fatigue.   Respiratory: Negative for shortness of breath and stridor.    Musculoskeletal: Negative for back pain and gait problem.   Psychiatric/Behavioral: Negative for self-injury and sleep disturbance. The patient is not nervous/anxious and is not hyperactive.    All other systems reviewed and are negative.      Current Outpatient Medications:   •  amLODIPine (NORVASC) 10 MG tablet, TAKE 1 TABLET BY MOUTH EVERY DAY, Disp: 90 tablet, Rfl: 3  •  ASPIRIN LOW DOSE 81 MG EC tablet, TAKE 1 TABLET BY MOUTH ONCE DAILY, Disp: 90 tablet, Rfl: 3  •  atorvastatin (LIPITOR) 10 MG tablet, TAKE 1 TABLET BY MOUTH ONCE DAILY, Disp: 90 tablet, Rfl: 3  •  calcium carbonate (OS-LESLY) 600 MG tablet, Take 600 mg by mouth Daily., Disp: , Rfl:   •  fluticasone (FLONASE) 50 MCG/ACT nasal spray, 1 spray into the nostril(s) as directed by provider As Needed., Disp: , Rfl:   •  lisinopril (PRINIVIL,ZESTRIL) 10 MG tablet, TAKE 1 TABLET BY MOUTH ONCE DAILY, Disp: 90 tablet, Rfl: 3  •  multivitamin (MULTI-VITAMIN DAILY PO), Take  by mouth Daily., Disp: , Rfl:   •  naproxen (NAPROSYN) 500 MG tablet, Take 1 tablet by mouth 2 (Two) Times a Day With Meals. (Patient taking differently: Take 500 mg by mouth As Needed.), Disp: 60 tablet, Rfl: 5  •  traZODone (DESYREL) 50 MG tablet, Take 1 tablet by mouth Every Night. (Patient taking differently: Take 50 mg by mouth As Needed.), Disp: 30 tablet, Rfl: 4  •  vitamin C (ASCORBIC ACID)  "250 MG tablet, Take 250 mg by mouth Daily., Disp: , Rfl:   •  tamsulosin (Flomax) 0.4 MG capsule 24 hr capsule, Take 1 capsule by mouth Every Night., Disp: 30 capsule, Rfl: 11    Objective      Blood pressure 124/78, pulse 68, temperature 97.5 °F (36.4 °C), resp. rate 16, height 185.4 cm (73\"), weight 98 kg (216 lb), SpO2 98 %.    Physical Exam     General Appearance:    Alert, cooperative, no distress, appears stated age   Head:    Normocephalic, without obvious abnormality, atraumatic   Eyes:    PERRL, conjunctiva/corneas clear, EOM's intact   Ears:    Normal TM's and external ear canals, both ears   Nose:   Nares normal, septum midline, mucosa normal, no drainage   or sinus tenderness   Throat:   Lips, mucosa, and tongue normal; teeth and gums normal   Neck:   Supple, symmetrical, trachea midline, no adenopathy;        thyroid:  No enlargement/tenderness/nodules; no carotid    bruit or JVD   Back:     Symmetric, no curvature, ROM normal, no CVA tenderness   Lungs:     Clear to auscultation bilaterally, respirations unlabored   Chest wall:    No tenderness or deformity   Heart:    Regular rate and rhythm, S1 and S2 normal, no murmur,        rub or gallop   Abdomen:     Soft, non-tender, bowel sounds active all four quadrants,     no masses, no organomegaly   Extremities:   Extremities normal, atraumatic, no cyanosis or edema   Pulses:   2+ and symmetric all extremities   Skin:   Skin color, texture, turgor normal, no rashes or lesions   Lymph nodes:   Cervical, supraclavicular, and axillary nodes normal   Neurologic:   CNII-XII intact. Normal strength, sensation and reflexes       throughout      Results for orders placed or performed in visit on 03/15/21   Lipid Panel    Specimen: Blood   Result Value Ref Range    Total Cholesterol 124 0 - 200 mg/dL    Triglycerides 60 0 - 150 mg/dL    HDL Cholesterol 46 40 - 60 mg/dL    VLDL Cholesterol Osmna 13 5 - 40 mg/dL    LDL Chol Calc ([facility]) 65 0 - 100 mg/dL   Comprehensive " Metabolic Panel    Specimen: Blood   Result Value Ref Range    Glucose 106 (H) 65 - 99 mg/dL    BUN 15 8 - 23 mg/dL    Creatinine 1.00 0.76 - 1.27 mg/dL    eGFR Non African Am 75 >60 mL/min/1.73    eGFR African Am 91 >60 mL/min/1.73    BUN/Creatinine Ratio 15.0 7.0 - 25.0    Sodium 140 136 - 145 mmol/L    Potassium 4.2 3.5 - 5.2 mmol/L    Chloride 106 98 - 107 mmol/L    Total CO2 24.0 22.0 - 29.0 mmol/L    Calcium 8.8 8.6 - 10.5 mg/dL    Total Protein 6.5 6.0 - 8.5 g/dL    Albumin 4.20 3.50 - 5.20 g/dL    Globulin 2.3 gm/dL    A/G Ratio 1.8 g/dL    Total Bilirubin 0.7 0.0 - 1.2 mg/dL    Alkaline Phosphatase 100 39 - 117 U/L    AST (SGOT) 21 1 - 40 U/L    ALT (SGPT) 31 1 - 41 U/L   Vitamin B12    Specimen: Blood   Result Value Ref Range    Vitamin B-12 684 211 - 946 pg/mL   TSH    Specimen: Blood   Result Value Ref Range    TSH 2.320 0.270 - 4.200 uIU/mL   T4, Free    Specimen: Blood   Result Value Ref Range    Free T4 1.23 0.93 - 1.70 ng/dL   PSA Screen    Specimen: Blood   Result Value Ref Range    PSA 4.380 (H) 0.000 - 4.000 ng/mL   CBC & Differential    Specimen: Blood   Result Value Ref Range    WBC 10.21 3.40 - 10.80 10*3/mm3    RBC 5.42 4.14 - 5.80 10*6/mm3    Hemoglobin 15.5 13.0 - 17.7 g/dL    Hematocrit 45.8 37.5 - 51.0 %    MCV 84.5 79.0 - 97.0 fL    MCH 28.6 26.6 - 33.0 pg    MCHC 33.8 31.5 - 35.7 g/dL    RDW 12.7 12.3 - 15.4 %    Platelets 289 140 - 450 10*3/mm3    Neutrophil Rel % 76.7 (H) 42.7 - 76.0 %    Lymphocyte Rel % 14.7 (L) 19.6 - 45.3 %    Monocyte Rel % 6.0 5.0 - 12.0 %    Eosinophil Rel % 1.7 0.3 - 6.2 %    Basophil Rel % 0.6 0.0 - 1.5 %    Neutrophils Absolute 7.84 (H) 1.70 - 7.00 10*3/mm3    Lymphocytes Absolute 1.50 0.70 - 3.10 10*3/mm3    Monocytes Absolute 0.61 0.10 - 0.90 10*3/mm3    Eosinophils Absolute 0.17 0.00 - 0.40 10*3/mm3    Basophils Absolute 0.06 0.00 - 0.20 10*3/mm3    Immature Granulocyte Rel % 0.3 0.0 - 0.5 %    Immature Grans Absolute 0.03 0.00 - 0.05 10*3/mm3    nRBC 0.0  0.0 - 0.2 /100 WBC         Assessment/Plan   Pt will start going to the gym.                Diagnoses and all orders for this visit:    1. Essential hypertension (Primary)    2. Benign prostatic hyperplasia with nocturia  -     tamsulosin (Flomax) 0.4 MG capsule 24 hr capsule; Take 1 capsule by mouth Every Night.  Dispense: 30 capsule; Refill: 11      Return in about 6 months (around 10/1/2021) for Medicare Wellness.          There are no Patient Instructions on file for this visit.     Kei Jarrett MD    Assessment/Plan

## 2021-04-06 ENCOUNTER — IMMUNIZATION (OUTPATIENT)
Dept: VACCINE CLINIC | Facility: HOSPITAL | Age: 66
End: 2021-04-06

## 2021-04-06 PROCEDURE — 91300 HC SARSCOV02 VAC 30MCG/0.3ML IM: CPT | Performed by: INTERNAL MEDICINE

## 2021-04-06 PROCEDURE — 0002A: CPT | Performed by: INTERNAL MEDICINE

## 2021-06-13 DIAGNOSIS — I10 ESSENTIAL HYPERTENSION: ICD-10-CM

## 2021-06-13 DIAGNOSIS — E78.00 HYPERCHOLESTEROLEMIA: ICD-10-CM

## 2021-06-14 RX ORDER — ATORVASTATIN CALCIUM 10 MG/1
TABLET, FILM COATED ORAL
Qty: 90 TABLET | Refills: 3 | Status: SHIPPED | OUTPATIENT
Start: 2021-06-14 | End: 2022-06-08

## 2021-06-14 RX ORDER — LISINOPRIL 10 MG/1
TABLET ORAL
Qty: 90 TABLET | Refills: 3 | Status: SHIPPED | OUTPATIENT
Start: 2021-06-14 | End: 2022-06-08

## 2021-09-11 DIAGNOSIS — I10 ESSENTIAL HYPERTENSION: ICD-10-CM

## 2021-09-13 RX ORDER — AMLODIPINE BESYLATE 10 MG/1
TABLET ORAL
Qty: 90 TABLET | Refills: 3 | Status: SHIPPED | OUTPATIENT
Start: 2021-09-13 | End: 2022-09-06

## 2021-11-11 ENCOUNTER — OFFICE VISIT (OUTPATIENT)
Dept: INTERNAL MEDICINE | Facility: CLINIC | Age: 66
End: 2021-11-11

## 2021-11-11 VITALS
OXYGEN SATURATION: 97 % | HEIGHT: 73 IN | WEIGHT: 212 LBS | HEART RATE: 58 BPM | TEMPERATURE: 97.9 F | BODY MASS INDEX: 28.1 KG/M2 | SYSTOLIC BLOOD PRESSURE: 118 MMHG | RESPIRATION RATE: 18 BRPM | DIASTOLIC BLOOD PRESSURE: 72 MMHG

## 2021-11-11 DIAGNOSIS — Z00.00 ROUTINE GENERAL MEDICAL EXAMINATION AT A HEALTH CARE FACILITY: ICD-10-CM

## 2021-11-11 DIAGNOSIS — I10 ESSENTIAL HYPERTENSION: ICD-10-CM

## 2021-11-11 DIAGNOSIS — E78.00 HYPERCHOLESTEROLEMIA: ICD-10-CM

## 2021-11-11 DIAGNOSIS — Z12.5 PROSTATE CANCER SCREENING: ICD-10-CM

## 2021-11-11 DIAGNOSIS — N40.0 BPH WITHOUT URINARY OBSTRUCTION: Primary | ICD-10-CM

## 2021-11-11 PROCEDURE — 1126F AMNT PAIN NOTED NONE PRSNT: CPT | Performed by: INTERNAL MEDICINE

## 2021-11-11 PROCEDURE — 99397 PER PM REEVAL EST PAT 65+ YR: CPT | Performed by: INTERNAL MEDICINE

## 2021-11-11 PROCEDURE — 2014F MENTAL STATUS ASSESS: CPT | Performed by: INTERNAL MEDICINE

## 2021-11-11 PROCEDURE — 1159F MED LIST DOCD IN RCRD: CPT | Performed by: INTERNAL MEDICINE

## 2021-11-11 PROCEDURE — 3008F BODY MASS INDEX DOCD: CPT | Performed by: INTERNAL MEDICINE

## 2021-11-11 PROCEDURE — G0439 PPPS, SUBSEQ VISIT: HCPCS | Performed by: INTERNAL MEDICINE

## 2021-11-11 PROCEDURE — 1170F FXNL STATUS ASSESSED: CPT | Performed by: INTERNAL MEDICINE

## 2021-11-11 NOTE — PROGRESS NOTES
QUICK REFERENCE INFORMATION:  The ABCs of the Annual Wellness Visit    Medicare Annual Wellness Visit    Subjective   History of Present Illness    Seng Seymour is a 66 y.o. male who presents for an Annual Wellness Visit. In addition, we addressed the following health issues:*      PMH, PSH, SocHx, FamHx, Allergies, and Medications: Reviewed and updated.     Outpatient Medications Prior to Visit   Medication Sig Dispense Refill   • amLODIPine (NORVASC) 10 MG tablet TAKE 1 TABLET BY MOUTH EVERY DAY 90 tablet 3   • ASPIRIN LOW DOSE 81 MG EC tablet TAKE 1 TABLET BY MOUTH ONCE DAILY 90 tablet 3   • atorvastatin (LIPITOR) 10 MG tablet TAKE 1 TABLET BY MOUTH EVERY DAY 90 tablet 3   • calcium carbonate (OS-LESLY) 600 MG tablet Take 600 mg by mouth Daily.     • fluticasone (FLONASE) 50 MCG/ACT nasal spray 1 spray into the nostril(s) as directed by provider As Needed.     • lisinopril (PRINIVIL,ZESTRIL) 10 MG tablet TAKE 1 TABLET BY MOUTH EVERY DAY 90 tablet 3   • multivitamin (MULTI-VITAMIN DAILY PO) Take  by mouth Daily.     • naproxen (NAPROSYN) 500 MG tablet Take 1 tablet by mouth 2 (Two) Times a Day With Meals. (Patient taking differently: Take 500 mg by mouth As Needed.) 60 tablet 5   • traZODone (DESYREL) 50 MG tablet Take 1 tablet by mouth Every Night. (Patient taking differently: Take 50 mg by mouth As Needed.) 30 tablet 4   • vitamin C (ASCORBIC ACID) 250 MG tablet Take 250 mg by mouth Daily.     • tamsulosin (Flomax) 0.4 MG capsule 24 hr capsule Take 1 capsule by mouth Every Night. 30 capsule 11     No facility-administered medications prior to visit.       Patient Active Problem List   Diagnosis   • Ankylosing spondylitis (HCC)   • Benign prostatic hyperplasia with urinary obstruction   • Erectile dysfunction of nonorganic origin   • Edema   • Hypercholesterolemia   • Essential hypertension   • Skin lesion   • Neurocardiogenic syncope   • Cobalamin deficiency   • Diverticulosis of large intestine   • Internal  hemorrhage       Health Habits:  Dental Exam. up to date  Eye Exam. up to date  No exam data present  Exercise: 7 times/week.  Current exercise activities include: aerobics, walking and weightlifting    Health Risk Assessment:  The patient has completed a Health Risk Assessment. This has been reviewed with them and has been scanned into the patient's chart.    Current Medical Providers:  Patient Care Team:  Kei Jarrett MD as PCP - General (Internal Medicine)    The [facility] providers who are involved in the care of this patient are listed above. Additional providers and suppliers are listed below:  Dr Rodríguez/rory        Recent Hospitalizations:  No hospitalization(s) within the last year..    Recent Lab Results:  CMP:  Lab Results   Component Value Date    BUN 15 03/29/2021    CREATININE 1.00 03/29/2021    EGFRIFNONA 75 03/29/2021    EGFRIFAFRI 91 03/29/2021    BCR 15.0 03/29/2021     03/29/2021    K 4.2 03/29/2021    CO2 24.0 03/29/2021    CALCIUM 8.8 03/29/2021    PROTENTOTREF 6.5 03/29/2021    ALBUMIN 4.20 03/29/2021    LABGLOBREF 2.3 03/29/2021    LABIL2 1.8 03/29/2021    BILITOT 0.7 03/29/2021    ALKPHOS 100 03/29/2021    AST 21 03/29/2021    ALT 31 03/29/2021       LIPID PANEL:  Lab Results   Component Value Date    CHLPL 124 03/29/2021    TRIG 60 03/29/2021    HDL 46 03/29/2021    VLDL 13 03/29/2021    LDL 65 03/29/2021       HbA1c:  No results found for: HGBA1C    URINE MICROALBUMIN:  No results found for: MICROALBUR, POCMALB    PSA:  Lab Results   Component Value Date    PSA 4.380 (H) 03/29/2021       Age-appropriate Screening Schedule:  Refer to the list below for future screening recommendations based on patient's age, sex and/or medical conditions. Orders for these recommended tests are listed in the plan section. The patient has been provided with a written plan.    Health Maintenance   Topic Date Due   • LIPID PANEL  03/29/2022   • TDAP/TD VACCINES (3 - Td or Tdap) 12/03/2024   •  INFLUENZA VACCINE  Completed   • ZOSTER VACCINE  Completed       Depression Screen:   PHQ-2/PHQ-9 Depression Screening 11/11/2021   Little interest or pleasure in doing things 0   Feeling down, depressed, or hopeless 0   Trouble falling or staying asleep, or sleeping too much -   Feeling tired or having little energy -   Poor appetite or overeating -   Feeling bad about yourself - or that you are a failure or have let yourself or your family down -   Trouble concentrating on things, such as reading the newspaper or watching television -   Moving or speaking so slowly that other people could have noticed. Or the opposite - being so fidgety or restless that you have been moving around a lot more than usual -   Total Score 0         Functional and Cognitive Screening:  Functional & Cognitive Status 11/11/2021   Do you have difficulty preparing food and eating? No   Do you have difficulty bathing yourself, getting dressed or grooming yourself? No   Do you have difficulty using the toilet? No   Do you have difficulty moving around from place to place? No   Do you have trouble with steps or getting out of a bed or a chair? No   Current Diet Well Balanced Diet   Dental Exam Up to date   Eye Exam Up to date   Exercise (times per week) 3 times per week   Current Exercises Include Cardiovascular Workout   Current Exercise Activities Include -   Do you need help using the phone?  No   Are you deaf or do you have serious difficulty hearing?  No   Do you need help with transportation? No   Do you need help shopping? No   Do you need help preparing meals?  No   Do you need help with housework?  No   Do you need help with laundry? No   Do you need help taking your medications? No   Do you need help managing money? No   Do you ever drive or ride in a car without wearing a seat belt? No   Have you felt unusual stress, anger or loneliness in the last month? No   Who do you live with? Alone   If you need help, do you have trouble  "finding someone available to you? No   Have you been bothered in the last four weeks by sexual problems? No   Do you have difficulty concentrating, remembering or making decisions? No       Does the patient have evidence of cognitive impairment? No    Advanced Care Planning:  ACP discussion was held with the patient during this visit. Patient does not have an advance directive, declines further assistance.    Identification of Risk Factors:  Risk factors include: Advance Directive Discussion  Chronic Pain   Inactivity/Sedentary.    Review of Systems   Constitutional: Negative.    HENT: Negative.    Respiratory: Negative.    All other systems reviewed and are negative.      Compared to one year ago, the patient feels his physical health is better.  Compared to one year ago, the patient feels his mental health is better.    Objective     Physical Exam  Vitals reviewed.   HENT:      Head: Normocephalic.      Right Ear: Tympanic membrane normal.      Left Ear: Tympanic membrane normal.      Nose: Nose normal.   Eyes:      Pupils: Pupils are equal, round, and reactive to light.   Cardiovascular:      Rate and Rhythm: Normal rate.   Pulmonary:      Effort: Pulmonary effort is normal.   Abdominal:      General: Abdomen is flat.   Musculoskeletal:      Cervical back: Normal range of motion.   Skin:     Capillary Refill: Capillary refill takes less than 2 seconds.   Neurological:      General: No focal deficit present.      Mental Status: He is alert and oriented to person, place, and time.   Psychiatric:         Mood and Affect: Mood normal.         Vitals:    11/11/21 1449   BP: 118/72   Pulse: 58   Resp: 18   Temp: 97.9 °F (36.6 °C)   SpO2: 97%   Weight: 96.2 kg (212 lb)   Height: 185.4 cm (73\")   PainSc: 0-No pain       Body mass index is 27.97 kg/m².  Discussed the patient's BMI with him. The BMI is in the acceptable range.    Assessment/Plan   Patient Self-Management and Personalized Health Advice  The patient has been " provided with information about: diet, exercise and weight management and preventive services including:   · Annual Wellness Visit (AWV).    Visit Diagnoses:    ICD-10-CM ICD-9-CM   1. BPH without urinary obstruction  N40.0 600.00   2. Routine general medical examination at a health care facility  Z00.00 V70.0   3. Prostate cancer screening  Z12.5 V76.44   4. Hypercholesterolemia  E78.00 272.0   5. Essential hypertension  I10 401.9       Orders Placed This Encounter   Procedures   • Ambulatory Referral to Urology     Referral Priority:   Routine     Referral Type:   Consultation     Referral Reason:   Specialty Services Required     Referred to Provider:   Myke Rodríguez MD     Requested Specialty:   Urology     Number of Visits Requested:   1       Outpatient Encounter Medications as of 11/11/2021   Medication Sig Dispense Refill   • amLODIPine (NORVASC) 10 MG tablet TAKE 1 TABLET BY MOUTH EVERY DAY 90 tablet 3   • ASPIRIN LOW DOSE 81 MG EC tablet TAKE 1 TABLET BY MOUTH ONCE DAILY 90 tablet 3   • atorvastatin (LIPITOR) 10 MG tablet TAKE 1 TABLET BY MOUTH EVERY DAY 90 tablet 3   • calcium carbonate (OS-LESLY) 600 MG tablet Take 600 mg by mouth Daily.     • fluticasone (FLONASE) 50 MCG/ACT nasal spray 1 spray into the nostril(s) as directed by provider As Needed.     • lisinopril (PRINIVIL,ZESTRIL) 10 MG tablet TAKE 1 TABLET BY MOUTH EVERY DAY 90 tablet 3   • multivitamin (MULTI-VITAMIN DAILY PO) Take  by mouth Daily.     • naproxen (NAPROSYN) 500 MG tablet Take 1 tablet by mouth 2 (Two) Times a Day With Meals. (Patient taking differently: Take 500 mg by mouth As Needed.) 60 tablet 5   • traZODone (DESYREL) 50 MG tablet Take 1 tablet by mouth Every Night. (Patient taking differently: Take 50 mg by mouth As Needed.) 30 tablet 4   • vitamin C (ASCORBIC ACID) 250 MG tablet Take 250 mg by mouth Daily.     • tamsulosin (Flomax) 0.4 MG capsule 24 hr capsule Take 1 capsule by mouth Every Night. 30 capsule 11     No  facility-administered encounter medications on file as of 11/11/2021.       Reviewed use of high risk medication in the elderly: yes  Reviewed for potential of harmful drug interactions in the elderly: yes    Follow Up:  No follow-ups on file.     An After Visit Summary and PPPS with all of these plans were given to the patient.             Diagnoses and all orders for this visit:    1. BPH without urinary obstruction (Primary)  -     Ambulatory Referral to Urology    2. Routine general medical examination at a health care facility    3. Prostate cancer screening    4. Hypercholesterolemia    5. Essential hypertension

## 2022-01-21 ENCOUNTER — LAB (OUTPATIENT)
Dept: LAB | Facility: HOSPITAL | Age: 67
End: 2022-01-21

## 2022-01-21 DIAGNOSIS — R97.20 ELEVATED PROSTATE SPECIFIC ANTIGEN (PSA): Primary | ICD-10-CM

## 2022-01-21 LAB
BASOPHILS # BLD AUTO: 0.05 10*3/MM3 (ref 0–0.2)
BASOPHILS NFR BLD AUTO: 0.6 % (ref 0–1.5)
DEPRECATED RDW RBC AUTO: 40 FL (ref 37–54)
EOSINOPHIL # BLD AUTO: 0.12 10*3/MM3 (ref 0–0.4)
EOSINOPHIL NFR BLD AUTO: 1.4 % (ref 0.3–6.2)
ERYTHROCYTE [DISTWIDTH] IN BLOOD BY AUTOMATED COUNT: 12.6 % (ref 12.3–15.4)
HCT VFR BLD AUTO: 46 % (ref 37.5–51)
HGB BLD-MCNC: 15.5 G/DL (ref 13–17.7)
IMM GRANULOCYTES # BLD AUTO: 0.02 10*3/MM3 (ref 0–0.05)
IMM GRANULOCYTES NFR BLD AUTO: 0.2 % (ref 0–0.5)
LYMPHOCYTES # BLD AUTO: 1.3 10*3/MM3 (ref 0.7–3.1)
LYMPHOCYTES NFR BLD AUTO: 15.3 % (ref 19.6–45.3)
MCH RBC QN AUTO: 29.2 PG (ref 26.6–33)
MCHC RBC AUTO-ENTMCNC: 33.7 G/DL (ref 31.5–35.7)
MCV RBC AUTO: 86.8 FL (ref 79–97)
MONOCYTES # BLD AUTO: 0.67 10*3/MM3 (ref 0.1–0.9)
MONOCYTES NFR BLD AUTO: 7.9 % (ref 5–12)
NEUTROPHILS NFR BLD AUTO: 6.35 10*3/MM3 (ref 1.7–7)
NEUTROPHILS NFR BLD AUTO: 74.6 % (ref 42.7–76)
NRBC BLD AUTO-RTO: 0 /100 WBC (ref 0–0.2)
PLATELET # BLD AUTO: 251 10*3/MM3 (ref 140–450)
PMV BLD AUTO: 12.5 FL (ref 6–12)
RBC # BLD AUTO: 5.3 10*6/MM3 (ref 4.14–5.8)
WBC NRBC COR # BLD: 8.51 10*3/MM3 (ref 3.4–10.8)

## 2022-01-21 PROCEDURE — 84153 ASSAY OF PSA TOTAL: CPT | Performed by: UROLOGY

## 2022-01-21 PROCEDURE — 85025 COMPLETE CBC W/AUTO DIFF WBC: CPT | Performed by: UROLOGY

## 2022-01-21 PROCEDURE — 36415 COLL VENOUS BLD VENIPUNCTURE: CPT | Performed by: UROLOGY

## 2022-01-22 LAB — PSA SERPL-MCNC: 4.61 NG/ML (ref 0–4)

## 2022-01-27 ENCOUNTER — OFFICE VISIT (OUTPATIENT)
Dept: UROLOGY | Facility: CLINIC | Age: 67
End: 2022-01-27

## 2022-01-27 VITALS
TEMPERATURE: 97.7 F | HEIGHT: 73 IN | WEIGHT: 212 LBS | SYSTOLIC BLOOD PRESSURE: 118 MMHG | OXYGEN SATURATION: 94 % | DIASTOLIC BLOOD PRESSURE: 52 MMHG | BODY MASS INDEX: 28.1 KG/M2 | HEART RATE: 65 BPM

## 2022-01-27 DIAGNOSIS — N39.41 URGE INCONTINENCE OF URINE: Primary | ICD-10-CM

## 2022-01-27 PROCEDURE — 99214 OFFICE O/P EST MOD 30 MIN: CPT | Performed by: UROLOGY

## 2022-01-27 NOTE — PROGRESS NOTES
Chief Complaint   Patient presents with   • Follow-up     follow up bph w/o urinary obstruction        HPI  Ms. Seymour is a 66 y.o. male with history below in assessment, who presents for follow up.     At this visit tried flomax and it didn't help that much. Most bothered by nocturia, daytime urgency, frequency, UUI.   Denies weak stream. No straining.     Hx of ankylosing spondylitis.     Past Medical History:   Diagnosis Date   • Hyperlipidemia    • Hypertension    • Parkinson disease (HCC)        Past Surgical History:   Procedure Laterality Date   • HERNIA REPAIR     • NASAL SEPTAL RECONSTRUCTION           Current Outpatient Medications:   •  amLODIPine (NORVASC) 10 MG tablet, TAKE 1 TABLET BY MOUTH EVERY DAY, Disp: 90 tablet, Rfl: 3  •  ASPIRIN LOW DOSE 81 MG EC tablet, TAKE 1 TABLET BY MOUTH ONCE DAILY, Disp: 90 tablet, Rfl: 3  •  atorvastatin (LIPITOR) 10 MG tablet, TAKE 1 TABLET BY MOUTH EVERY DAY, Disp: 90 tablet, Rfl: 3  •  calcium carbonate (OS-LESLY) 600 MG tablet, Take 600 mg by mouth Daily., Disp: , Rfl:   •  fluticasone (FLONASE) 50 MCG/ACT nasal spray, 1 spray into the nostril(s) as directed by provider As Needed., Disp: , Rfl:   •  lisinopril (PRINIVIL,ZESTRIL) 10 MG tablet, TAKE 1 TABLET BY MOUTH EVERY DAY, Disp: 90 tablet, Rfl: 3  •  multivitamin (MULTI-VITAMIN DAILY PO), Take  by mouth Daily., Disp: , Rfl:   •  naproxen (NAPROSYN) 500 MG tablet, Take 1 tablet by mouth 2 (Two) Times a Day With Meals. (Patient taking differently: Take 500 mg by mouth As Needed.), Disp: 60 tablet, Rfl: 5  •  tamsulosin (Flomax) 0.4 MG capsule 24 hr capsule, Take 1 capsule by mouth Every Night., Disp: 30 capsule, Rfl: 11  •  traZODone (DESYREL) 50 MG tablet, Take 1 tablet by mouth Every Night. (Patient taking differently: Take 50 mg by mouth As Needed.), Disp: 30 tablet, Rfl: 4  •  vitamin C (ASCORBIC ACID) 250 MG tablet, Take 250 mg by mouth Daily., Disp: , Rfl:      Physical Exam  Visit Vitals  /52   Pulse 65  "  Temp 97.7 °F (36.5 °C)   Ht 185.4 cm (73\")   Wt 96.2 kg (212 lb)   SpO2 94%   BMI 27.97 kg/m²       Labs  Brief Urine Lab Results     None          Lab Results   Component Value Date    GLUCOSE 106 (H) 03/29/2021    CALCIUM 8.8 03/29/2021     03/29/2021    K 4.2 03/29/2021    CO2 24.0 03/29/2021     03/29/2021    BUN 15 03/29/2021    CREATININE 1.00 03/29/2021    EGFRIFAFRI 91 03/29/2021    EGFRIFNONA 75 03/29/2021    BCR 15.0 03/29/2021    ANIONGAP 17.0 (H) 11/30/2016       Lab Results   Component Value Date    WBC 8.51 01/21/2022    HGB 15.5 01/21/2022    HCT 46.0 01/21/2022    MCV 86.8 01/21/2022     01/21/2022       Lab Results   Component Value Date    PSA 4.610 (H) 01/21/2022    PSA 4.380 (H) 03/29/2021    PSA 4.4 (H) 12/29/2020           Radiographic Studies  No Images in the past 120 days found..      I have reviewed above labs and imaging.     Assessment  66 y.o. male with LUTS, mainly OAB and UUI. He also has fluctuating, elevated PSA.    Plan  1. Start Myrbetriq   2. 4k score test     "

## 2022-05-24 DIAGNOSIS — N40.1 BENIGN PROSTATIC HYPERPLASIA WITH NOCTURIA: ICD-10-CM

## 2022-05-24 DIAGNOSIS — R35.1 BENIGN PROSTATIC HYPERPLASIA WITH NOCTURIA: ICD-10-CM

## 2022-05-24 RX ORDER — TAMSULOSIN HYDROCHLORIDE 0.4 MG/1
1 CAPSULE ORAL NIGHTLY
Qty: 30 CAPSULE | Refills: 11 | Status: SHIPPED | OUTPATIENT
Start: 2022-05-24 | End: 2022-08-15

## 2022-05-24 NOTE — TELEPHONE ENCOUNTER
Rx Refill Note  Requested Prescriptions     Pending Prescriptions Disp Refills   • tamsulosin (FLOMAX) 0.4 MG capsule 24 hr capsule [Pharmacy Med Name: TAMSULOSIN 0.4MG CAPSULES] 30 capsule 11     Sig: TAKE 1 CAPSULE BY MOUTH EVERY NIGHT      Last office visit with prescribing clinician: 11/11/2021      Next office visit with prescribing clinician: 11/14/2022            AMBER XIAO MA  05/24/22, 10:15 EDT

## 2022-06-03 ENCOUNTER — OFFICE VISIT (OUTPATIENT)
Dept: UROLOGY | Facility: CLINIC | Age: 67
End: 2022-06-03

## 2022-06-03 VITALS
TEMPERATURE: 97.3 F | SYSTOLIC BLOOD PRESSURE: 123 MMHG | DIASTOLIC BLOOD PRESSURE: 81 MMHG | OXYGEN SATURATION: 98 % | WEIGHT: 212 LBS | BODY MASS INDEX: 28.1 KG/M2 | HEIGHT: 73 IN | HEART RATE: 59 BPM

## 2022-06-03 DIAGNOSIS — R97.20 ELEVATED PSA: ICD-10-CM

## 2022-06-03 DIAGNOSIS — N39.41 URGE INCONTINENCE OF URINE: Primary | ICD-10-CM

## 2022-06-03 DIAGNOSIS — R39.9 LOWER URINARY TRACT SYMPTOMS (LUTS): ICD-10-CM

## 2022-06-03 LAB
BILIRUB BLD-MCNC: NEGATIVE MG/DL
CLARITY, POC: CLEAR
COLOR UR: YELLOW
EXPIRATION DATE: NORMAL
GLUCOSE UR STRIP-MCNC: NEGATIVE MG/DL
KETONES UR QL: NEGATIVE
LEUKOCYTE EST, POC: NEGATIVE
Lab: NORMAL
NITRITE UR-MCNC: NEGATIVE MG/ML
PH UR: 6.5 [PH] (ref 5–8)
PROT UR STRIP-MCNC: NEGATIVE MG/DL
RBC # UR STRIP: NEGATIVE /UL
SP GR UR: 1.01 (ref 1–1.03)
UROBILINOGEN UR QL: NORMAL

## 2022-06-03 PROCEDURE — 81003 URINALYSIS AUTO W/O SCOPE: CPT | Performed by: UROLOGY

## 2022-06-03 PROCEDURE — 99214 OFFICE O/P EST MOD 30 MIN: CPT | Performed by: UROLOGY

## 2022-06-03 NOTE — PROGRESS NOTES
Chief Complaint   Patient presents with   • Follow-up     Urge incontinece concerns        HPI  Ms. Seymour is a 67 y.o. male with history below in assessment, who presents for follow up.     At this visit patient complains of worsening of his chronic lower urinary tract symptoms.    IPSS Questionnaire (AUA-7):               Past Medical History:   Diagnosis Date   • Ankylosing spondylitis (HCC)    • Hyperlipidemia    • Hypertension        Past Surgical History:   Procedure Laterality Date   • HERNIA REPAIR     • NASAL SEPTAL RECONSTRUCTION           Current Outpatient Medications:   •  amLODIPine (NORVASC) 10 MG tablet, TAKE 1 TABLET BY MOUTH EVERY DAY, Disp: 90 tablet, Rfl: 3  •  ASPIRIN LOW DOSE 81 MG EC tablet, TAKE 1 TABLET BY MOUTH ONCE DAILY, Disp: 90 tablet, Rfl: 3  •  atorvastatin (LIPITOR) 10 MG tablet, TAKE 1 TABLET BY MOUTH EVERY DAY, Disp: 90 tablet, Rfl: 3  •  calcium carbonate (OS-LESLY) 600 MG tablet, Take 600 mg by mouth Daily., Disp: , Rfl:   •  fluticasone (FLONASE) 50 MCG/ACT nasal spray, 1 spray into the nostril(s) as directed by provider As Needed., Disp: , Rfl:   •  lisinopril (PRINIVIL,ZESTRIL) 10 MG tablet, TAKE 1 TABLET BY MOUTH EVERY DAY, Disp: 90 tablet, Rfl: 3  •  Mirabegron ER (Myrbetriq) 25 MG tablet sustained-release 24 hour 24 hr tablet, Take 1 tablet by mouth Daily., Disp: 30 tablet, Rfl: 11  •  multivitamin (MULTI-VITAMIN DAILY PO), Take  by mouth Daily., Disp: , Rfl:   •  naproxen (NAPROSYN) 500 MG tablet, Take 1 tablet by mouth 2 (Two) Times a Day With Meals. (Patient taking differently: Take 500 mg by mouth As Needed.), Disp: 60 tablet, Rfl: 5  •  tamsulosin (FLOMAX) 0.4 MG capsule 24 hr capsule, TAKE 1 CAPSULE BY MOUTH EVERY NIGHT, Disp: 30 capsule, Rfl: 11  •  traZODone (DESYREL) 50 MG tablet, Take 1 tablet by mouth Every Night. (Patient taking differently: Take 50 mg by mouth As Needed.), Disp: 30 tablet, Rfl: 4  •  vitamin C (ASCORBIC ACID) 250 MG tablet, Take 250 mg by mouth  "Daily., Disp: , Rfl:      Physical Exam  Visit Vitals  /81 (BP Location: Right arm, Patient Position: Sitting, Cuff Size: Adult)   Pulse 59   Temp 97.3 °F (36.3 °C) (Temporal)   Ht 185.4 cm (73\")   Wt 96.2 kg (212 lb)   SpO2 98%   BMI 27.97 kg/m²       Labs  Brief Urine Lab Results  (Last result in the past 365 days)      Color   Clarity   Blood   Leuk Est   Nitrite   Protein   CREAT   Urine HCG        06/03/22 0902 Yellow   Clear   Negative   Negative   Negative   Negative                 Lab Results   Component Value Date    GLUCOSE 106 (H) 03/29/2021    CALCIUM 8.8 03/29/2021     03/29/2021    K 4.2 03/29/2021    CO2 24.0 03/29/2021     03/29/2021    BUN 15 03/29/2021    CREATININE 1.00 03/29/2021    EGFRIFAFRI 91 03/29/2021    EGFRIFNONA 75 03/29/2021    BCR 15.0 03/29/2021    ANIONGAP 17.0 (H) 11/30/2016       Lab Results   Component Value Date    WBC 8.51 01/21/2022    HGB 15.5 01/21/2022    HCT 46.0 01/21/2022    MCV 86.8 01/21/2022     01/21/2022            Lab Results   Component Value Date    PSA 4.610 (H) 01/21/2022    PSA 4.380 (H) 03/29/2021    PSA 4.4 (H) 12/29/2020             Radiographic Studies  No Images in the past 120 days found..        I have reviewed above labs and imaging.     Assessment  67 y.o. male with now worsening of chronic LUTS, mainly OAB and UUI, despite beta 3 agonist. He also has fluctuating, elevated PSA, which is stable.  4K score showed very low risk of prostate cancer on biopsy in the past.    Plan  1. BPH workup with cystoscopy, uroflow, TRUS.  Continue Myrbetriq for now.  "

## 2022-06-08 DIAGNOSIS — E78.00 HYPERCHOLESTEROLEMIA: ICD-10-CM

## 2022-06-08 DIAGNOSIS — I10 ESSENTIAL HYPERTENSION: ICD-10-CM

## 2022-06-08 RX ORDER — ATORVASTATIN CALCIUM 10 MG/1
TABLET, FILM COATED ORAL
Qty: 90 TABLET | Refills: 3 | Status: SHIPPED | OUTPATIENT
Start: 2022-06-08

## 2022-06-08 RX ORDER — LISINOPRIL 10 MG/1
TABLET ORAL
Qty: 90 TABLET | Refills: 3 | Status: SHIPPED | OUTPATIENT
Start: 2022-06-08

## 2022-08-15 ENCOUNTER — PROCEDURE VISIT (OUTPATIENT)
Dept: UROLOGY | Facility: CLINIC | Age: 67
End: 2022-08-15

## 2022-08-15 DIAGNOSIS — N40.1 BENIGN PROSTATIC HYPERPLASIA WITH URINARY OBSTRUCTION: Primary | ICD-10-CM

## 2022-08-15 DIAGNOSIS — N13.8 BENIGN PROSTATIC HYPERPLASIA WITH URINARY OBSTRUCTION: Primary | ICD-10-CM

## 2022-08-15 PROCEDURE — 99213 OFFICE O/P EST LOW 20 MIN: CPT | Performed by: UROLOGY

## 2022-08-15 PROCEDURE — 76872 US TRANSRECTAL: CPT | Performed by: UROLOGY

## 2022-08-15 PROCEDURE — 51741 ELECTRO-UROFLOWMETRY FIRST: CPT | Performed by: UROLOGY

## 2022-08-15 PROCEDURE — 52000 CYSTOURETHROSCOPY: CPT | Performed by: UROLOGY

## 2022-08-15 NOTE — PROGRESS NOTES
CC  LUTS / BPH Workup    HPI  Ms. Seymour is a 67 y.o. male with history below in assessment, who presents for follow up.     At this visit patient is here for BPH workup and discussion.     Past Medical History:   Diagnosis Date   • Ankylosing spondylitis (HCC)    • Hyperlipidemia    • Hypertension        Past Surgical History:   Procedure Laterality Date   • HERNIA REPAIR     • NASAL SEPTAL RECONSTRUCTION           Current Outpatient Medications:   •  amLODIPine (NORVASC) 10 MG tablet, TAKE 1 TABLET BY MOUTH EVERY DAY, Disp: 90 tablet, Rfl: 3  •  ASPIRIN LOW DOSE 81 MG EC tablet, TAKE 1 TABLET BY MOUTH ONCE DAILY, Disp: 90 tablet, Rfl: 3  •  atorvastatin (LIPITOR) 10 MG tablet, TAKE 1 TABLET BY MOUTH EVERY DAY, Disp: 90 tablet, Rfl: 3  •  calcium carbonate (OS-LESLY) 600 MG tablet, Take 600 mg by mouth Daily., Disp: , Rfl:   •  fluticasone (FLONASE) 50 MCG/ACT nasal spray, 1 spray into the nostril(s) as directed by provider As Needed., Disp: , Rfl:   •  lisinopril (PRINIVIL,ZESTRIL) 10 MG tablet, TAKE 1 TABLET BY MOUTH EVERY DAY, Disp: 90 tablet, Rfl: 3  •  Mirabegron ER (Myrbetriq) 25 MG tablet sustained-release 24 hour 24 hr tablet, Take 1 tablet by mouth Daily., Disp: 30 tablet, Rfl: 11  •  multivitamin (MULTI-VITAMIN DAILY PO), Take  by mouth Daily., Disp: , Rfl:   •  naproxen (NAPROSYN) 500 MG tablet, Take 1 tablet by mouth 2 (Two) Times a Day With Meals. (Patient taking differently: Take 500 mg by mouth As Needed.), Disp: 60 tablet, Rfl: 5  •  tamsulosin (FLOMAX) 0.4 MG capsule 24 hr capsule, TAKE 1 CAPSULE BY MOUTH EVERY NIGHT, Disp: 30 capsule, Rfl: 11  •  traZODone (DESYREL) 50 MG tablet, Take 1 tablet by mouth Every Night. (Patient taking differently: Take 50 mg by mouth As Needed.), Disp: 30 tablet, Rfl: 4  •  vitamin C (ASCORBIC ACID) 250 MG tablet, Take 250 mg by mouth Daily., Disp: , Rfl:      Physical Exam  There were no vitals taken for this visit.    Labs  Brief Urine Lab Results  (Last result in the  past 365 days)      Color   Clarity   Blood   Leuk Est   Nitrite   Protein   CREAT   Urine HCG        06/03/22 0902 Yellow   Clear   Negative   Negative   Negative   Negative                 Lab Results   Component Value Date    GLUCOSE 106 (H) 03/29/2021    CALCIUM 8.8 03/29/2021     03/29/2021    K 4.2 03/29/2021    CO2 24.0 03/29/2021     03/29/2021    BUN 15 03/29/2021    CREATININE 1.00 03/29/2021    EGFRIFAFRI 91 03/29/2021    EGFRIFNONA 75 03/29/2021    BCR 15.0 03/29/2021    ANIONGAP 17.0 (H) 11/30/2016       Lab Results   Component Value Date    WBC 8.51 01/21/2022    HGB 15.5 01/21/2022    HCT 46.0 01/21/2022    MCV 86.8 01/21/2022     01/21/2022            Lab Results   Component Value Date    PSA 4.610 (H) 01/21/2022    PSA 4.380 (H) 03/29/2021    PSA 4.4 (H) 12/29/2020       Radiographic Studies  No Images in the past 120 days found..    I have reviewed above labs and imaging.     • CYSTOSCOPY  Preprocedure diagnosis  LUTS  Postprocedure diagnosis  Same  Procedure  Flexible Cystourethroscopy  Attending surgeon  Myke Rodríguez MD  Anesthesia  2% lidocaine jelly intraurethrally  Complications  None  Indications  67 y.o. male undergoing a flexible cystoscopy for the above mentioned indications.  Informed consent was obtained.    Findings  Cystoscopic findings included one right and left ureteral orifice in the normal anatomic position with normal bladder mucosa and no tumors, masses or stones. The urethral urothelium was within normal limits with no strictures.  There was not a prominent median lobe.  The lateral lobes were quite large and obstructive in appearance.    Procedure  The patient was placed in supine position and prepped and draped in sterile fashion with lidocaine jelly per urethra for anesthesia.  A timeout was performed.  The 14F flexible cystoscope was lubricated and gently placed through the penile urethra and into the bladder.  The bladder was completely  visualized.  The cystoscope was retroflexed and the bladder neck and prostate visualized.  The cystoscope was slowly withdrawn while visualizing the urethra and the procedure terminated.  The patient tolerated the procedure well.      • TRUS OF PROSTATE   Preoperative diagnosis  LUTS  Postoperative diagnosis  Same  Procedure  1.  Transrectal ultrasound of the prostate  Attending Surgeon  Myke Rodríguez MD  Anesthesia  2% lidocaine jelly, intrarectal instillation, 10mL  Complications  None  Specimen  None  Indications  Mr. Seymour is a 67 y.o. male with LUTS.  He presents for prostate ultrasound to evaluate prostate size.  Procedure  The patient was positioned and prepped in a left lateral position with lower extremities flexed.  Lidocaine jelly, 2%, was injected per rectum. A digital rectal exam was performed which demonstrated a smooth prostate without nodules or induration. The [employer]ia[employer] E8CS rectal ultrasound probe was slowly introduced into the rectum without difficulty.  The prostate and seminal vesicles were inspected systematically using cross and sagittal views with the ultrasound. The dimensions of the prostate were measured, for a calculated volume of 61.3 mL with 6.2 cm prostatic width.  The seminal vesicles appeared normal.  The rectal ultrasound probe was removed.  The patient tolerated the procedure well.    • UROFLOW  Peak flow rate - 9.2 mL/sec  Average flow rate - 5.9 mL/sec  Flow curve - long low  Voided volume - 159 mL    I personally reviewed  and interpreted this study.       Assessment  67 y.o. male with worsening of chronic lower urinary tract symptoms.     In a separate room and encounter after his workup, we discussed different treatment options. Based on his anatomy, mainly prostatic width, I would recommend TURP. He has been on alpha blockers and 5 ALICIA.  He wants some time to think about his different options.  I quoted him that maximal medical therapy would reduce his total IPSS score by  approximately 25 to 30%, where surgical management would reduce his symptomatology by at least 50%, based on contemporary studies.  Most bothersome symptom necessitating treatment is nocturia x4.    Plan  1. FU televisit to discuss in 3 weeks

## 2022-09-06 DIAGNOSIS — I10 ESSENTIAL HYPERTENSION: ICD-10-CM

## 2022-09-06 RX ORDER — AMLODIPINE BESYLATE 10 MG/1
TABLET ORAL
Qty: 90 TABLET | Refills: 3 | Status: SHIPPED | OUTPATIENT
Start: 2022-09-06

## 2022-09-22 ENCOUNTER — OFFICE VISIT (OUTPATIENT)
Dept: UROLOGY | Facility: CLINIC | Age: 67
End: 2022-09-22

## 2022-09-22 VITALS
OXYGEN SATURATION: 95 % | WEIGHT: 212 LBS | HEART RATE: 65 BPM | TEMPERATURE: 97.6 F | BODY MASS INDEX: 28.1 KG/M2 | DIASTOLIC BLOOD PRESSURE: 80 MMHG | HEIGHT: 73 IN | SYSTOLIC BLOOD PRESSURE: 128 MMHG

## 2022-09-22 DIAGNOSIS — R39.9 LOWER URINARY TRACT SYMPTOMS (LUTS): Primary | ICD-10-CM

## 2022-09-22 PROCEDURE — 99215 OFFICE O/P EST HI 40 MIN: CPT | Performed by: UROLOGY

## 2022-09-22 NOTE — PROGRESS NOTES
Chief Complaint   Patient presents with   • Follow-up     3 wk fu- discuss options        HPI  Ms. Seymour is a 67 y.o. male with history below in assessment, who presents for follow up.     At this visit overall happy with where he is at, but would like to urinate better, and is concerned for long-term bladder deterioration.    Past Medical History:   Diagnosis Date   • Ankylosing spondylitis (HCC)    • Hyperlipidemia    • Hypertension        Past Surgical History:   Procedure Laterality Date   • HERNIA REPAIR     • NASAL SEPTAL RECONSTRUCTION           Current Outpatient Medications:   •  amLODIPine (NORVASC) 10 MG tablet, TAKE 1 TABLET BY MOUTH EVERY DAY, Disp: 90 tablet, Rfl: 3  •  ASPIRIN LOW DOSE 81 MG EC tablet, TAKE 1 TABLET BY MOUTH ONCE DAILY, Disp: 90 tablet, Rfl: 3  •  atorvastatin (LIPITOR) 10 MG tablet, TAKE 1 TABLET BY MOUTH EVERY DAY, Disp: 90 tablet, Rfl: 3  •  calcium carbonate (OS-LESLY) 600 MG tablet, Take 600 mg by mouth Daily., Disp: , Rfl:   •  fluticasone (FLONASE) 50 MCG/ACT nasal spray, 1 spray into the nostril(s) as directed by provider As Needed., Disp: , Rfl:   •  lisinopril (PRINIVIL,ZESTRIL) 10 MG tablet, TAKE 1 TABLET BY MOUTH EVERY DAY, Disp: 90 tablet, Rfl: 3  •  Mirabegron ER (Myrbetriq) 25 MG tablet sustained-release 24 hour 24 hr tablet, Take 1 tablet by mouth Daily., Disp: 30 tablet, Rfl: 11  •  multivitamin (THERAGRAN) tablet tablet, Take  by mouth Daily., Disp: , Rfl:   •  naproxen (NAPROSYN) 500 MG tablet, Take 1 tablet by mouth 2 (Two) Times a Day With Meals. (Patient taking differently: Take 500 mg by mouth As Needed.), Disp: 60 tablet, Rfl: 5  •  traZODone (DESYREL) 50 MG tablet, Take 1 tablet by mouth Every Night. (Patient taking differently: Take 50 mg by mouth As Needed.), Disp: 30 tablet, Rfl: 4  •  vitamin C (ASCORBIC ACID) 250 MG tablet, Take 250 mg by mouth Daily., Disp: , Rfl:      Physical Exam  Visit Vitals  /80 (BP Location: Left arm, Patient Position:  "Sitting, Cuff Size: Adult)   Pulse 65   Temp 97.6 °F (36.4 °C) (Temporal)   Ht 185.4 cm (73\")   Wt 96.2 kg (212 lb)   SpO2 95%   BMI 27.97 kg/m²       Labs  Brief Urine Lab Results  (Last result in the past 365 days)      Color   Clarity   Blood   Leuk Est   Nitrite   Protein   CREAT   Urine HCG        06/03/22 0902 Yellow   Clear   Negative   Negative   Negative   Negative                 Lab Results   Component Value Date    GLUCOSE 106 (H) 03/29/2021    CALCIUM 8.8 03/29/2021     03/29/2021    K 4.2 03/29/2021    CO2 24.0 03/29/2021     03/29/2021    BUN 15 03/29/2021    CREATININE 1.00 03/29/2021    EGFRIFAFRI 91 03/29/2021    EGFRIFNONA 75 03/29/2021    BCR 15.0 03/29/2021    ANIONGAP 17.0 (H) 11/30/2016       Lab Results   Component Value Date    WBC 8.51 01/21/2022    HGB 15.5 01/21/2022    HCT 46.0 01/21/2022    MCV 86.8 01/21/2022     01/21/2022       Lab Results   Component Value Date    PSA 4.610 (H) 01/21/2022    PSA 4.380 (H) 03/29/2021    PSA 4.4 (H) 12/29/2020         Radiographic Studies  No Images in the past 120 days found.    I have reviewed above labs and imaging.     Assessment  67 y.o. male with worsening of chronic LUTS, mainly OAB and UUI, despite beta 3 agonist. He also has fluctuating, elevated PSA, which is stable.  4K score showed very low risk of prostate cancer on biopsy in the past.  Prostate work-up demonstrated 60 cc prostate, with a 6 cm width, making him not a good candidate for UroLift.  I recommended TURP at last visit. He is very scared of SE of stricture disease, retrograde ejaculation.  He is also very fearful of bladder decompensation, which he already has evidence of with his urge incontinence.    We therefore engaged in a long discussion about the risks, benefits, and alternatives to TURP as above.  The risk that we discussed included but were not limited to bleeding, infection, damage to the urethra, stricture disease, need for further procedures, " incontinence, erectile dysfunction.    Plan  1. Schedule TURP at [facility]    I spent a total of 40 minutes with the patient and the chart engaging in data gathering and interpretation, patient interaction, as well as counseling on the risks, benefits, and alternatives of the therapy and coordinating care.

## 2022-11-14 ENCOUNTER — OFFICE VISIT (OUTPATIENT)
Dept: INTERNAL MEDICINE | Facility: CLINIC | Age: 67
End: 2022-11-14

## 2022-11-14 VITALS
BODY MASS INDEX: 26.64 KG/M2 | SYSTOLIC BLOOD PRESSURE: 110 MMHG | HEIGHT: 73 IN | DIASTOLIC BLOOD PRESSURE: 70 MMHG | WEIGHT: 201 LBS | OXYGEN SATURATION: 98 % | TEMPERATURE: 97.8 F | HEART RATE: 65 BPM | RESPIRATION RATE: 16 BRPM

## 2022-11-14 DIAGNOSIS — L98.9 SKIN LESION: Primary | ICD-10-CM

## 2022-11-14 DIAGNOSIS — G47.09 OTHER INSOMNIA: ICD-10-CM

## 2022-11-14 DIAGNOSIS — L91.8 MULTIPLE ACQUIRED SKIN TAGS: ICD-10-CM

## 2022-11-14 DIAGNOSIS — R93.3 ABNORMAL FINDINGS ON DIAGNOSTIC IMAGING OF OTHER PARTS OF DIGESTIVE TRACT: ICD-10-CM

## 2022-11-14 DIAGNOSIS — N39.41 URGE INCONTINENCE OF URINE: ICD-10-CM

## 2022-11-14 DIAGNOSIS — Z12.5 ENCOUNTER FOR SCREENING FOR MALIGNANT NEOPLASM OF PROSTATE: ICD-10-CM

## 2022-11-14 PROCEDURE — 1159F MED LIST DOCD IN RCRD: CPT | Performed by: INTERNAL MEDICINE

## 2022-11-14 PROCEDURE — 1170F FXNL STATUS ASSESSED: CPT | Performed by: INTERNAL MEDICINE

## 2022-11-14 PROCEDURE — G0439 PPPS, SUBSEQ VISIT: HCPCS | Performed by: INTERNAL MEDICINE

## 2022-11-14 PROCEDURE — 1125F AMNT PAIN NOTED PAIN PRSNT: CPT | Performed by: INTERNAL MEDICINE

## 2022-11-14 PROCEDURE — 99397 PER PM REEVAL EST PAT 65+ YR: CPT | Performed by: INTERNAL MEDICINE

## 2022-11-14 RX ORDER — TRAZODONE HYDROCHLORIDE 50 MG/1
50 TABLET ORAL NIGHTLY
Qty: 30 TABLET | Refills: 4 | Status: SHIPPED | OUTPATIENT
Start: 2022-11-14

## 2022-11-14 NOTE — PROGRESS NOTES
QUICK REFERENCE INFORMATION:  The ABCs of the Annual Wellness Visit    Medicare Annual Wellness Visit    Subjective   History of Present Illness    Seng Seymour is a 67 y.o. male who presents for an Annual Wellness Visit. In addition, we addressed the following health issues      PMH, PSH, SocHx, FamHx, Allergies, and Medications: Reviewed and updated.     Outpatient Medications Prior to Visit   Medication Sig Dispense Refill   • amLODIPine (NORVASC) 10 MG tablet TAKE 1 TABLET BY MOUTH EVERY DAY 90 tablet 3   • ASPIRIN LOW DOSE 81 MG EC tablet TAKE 1 TABLET BY MOUTH ONCE DAILY 90 tablet 3   • atorvastatin (LIPITOR) 10 MG tablet TAKE 1 TABLET BY MOUTH EVERY DAY 90 tablet 3   • calcium carbonate (OS-LESLY) 600 MG tablet Take 600 mg by mouth Daily.     • fluticasone (FLONASE) 50 MCG/ACT nasal spray 1 spray into the nostril(s) as directed by provider As Needed.     • lisinopril (PRINIVIL,ZESTRIL) 10 MG tablet TAKE 1 TABLET BY MOUTH EVERY DAY 90 tablet 3   • Mirabegron ER (Myrbetriq) 25 MG tablet sustained-release 24 hour 24 hr tablet Take 1 tablet by mouth Daily. 30 tablet 11   • multivitamin (THERAGRAN) tablet tablet Take  by mouth Daily.     • naproxen (NAPROSYN) 500 MG tablet Take 1 tablet by mouth 2 (Two) Times a Day With Meals. (Patient taking differently: Take 1 tablet by mouth As Needed.) 60 tablet 5   • vitamin C (ASCORBIC ACID) 250 MG tablet Take 250 mg by mouth Daily.     • traZODone (DESYREL) 50 MG tablet Take 1 tablet by mouth Every Night. (Patient taking differently: Take 1 tablet by mouth As Needed.) 30 tablet 4     No facility-administered medications prior to visit.       Patient Active Problem List   Diagnosis   • Ankylosing spondylitis (HCC)   • Benign prostatic hyperplasia with urinary obstruction   • Erectile dysfunction of nonorganic origin   • Edema   • Hypercholesterolemia   • Essential hypertension   • Skin lesion   • Neurocardiogenic syncope   • Cobalamin deficiency   • Diverticulosis of  large intestine   • Internal hemorrhage       Health Habits:  Dental Exam. up to date  Eye Exam. up to date  No results found.  Exercise: 7 times/week.  Current exercise activities include: aerobics, walking, weightlifting and yard work    Health Risk Assessment:  The patient has completed a Health Risk Assessment. This has been reviewed with them and has been scanned into the patient's chart.    Current Medical Providers:  Patient Care Team:  Kei Jarrett MD as PCP - General (Internal Medicine)  Myke Rodríguez MD as Consulting Physician (Urology)  Robi Jain IV, MD as Consulting Physician (Interventional Cardiology)    The [facility] providers who are involved in the care of this patient are listed above. Additional providers and suppliers are listed below:      Recent Hospitalizations:  No hospitalization(s) within the last year..    Recent Lab Results:  CMP:  Lab Results   Component Value Date    BUN 15 03/29/2021    CREATININE 1.00 03/29/2021    EGFRIFNONA 75 03/29/2021    EGFRIFAFRI 91 03/29/2021    BCR 15.0 03/29/2021     03/29/2021    K 4.2 03/29/2021    CO2 24.0 03/29/2021    CALCIUM 8.8 03/29/2021    PROTENTOTREF 6.5 03/29/2021    ALBUMIN 4.20 03/29/2021    LABGLOBREF 2.3 03/29/2021    LABIL2 1.8 03/29/2021    BILITOT 0.7 03/29/2021    ALKPHOS 100 03/29/2021    AST 21 03/29/2021    ALT 31 03/29/2021       LIPID PANEL:  Lab Results   Component Value Date    CHLPL 124 03/29/2021    TRIG 60 03/29/2021    HDL 46 03/29/2021    VLDL 13 03/29/2021    LDL 65 03/29/2021       HbA1c:  No results found for: HGBA1C    URINE MICROALBUMIN:  No results found for: MICROALBUR, POCMALB    PSA:  Lab Results   Component Value Date    PSA 4.610 (H) 01/21/2022       Age-appropriate Screening Schedule:  Refer to the list below for future screening recommendations based on patient's age, sex and/or medical conditions. Orders for these recommended tests are listed in the plan section. The  patient has been provided with a written plan.    Health Maintenance   Topic Date Due   • LIPID PANEL  03/29/2022   • TDAP/TD VACCINES (3 - Td or Tdap) 12/03/2024   • INFLUENZA VACCINE  Completed   • ZOSTER VACCINE  Completed       Depression Screen:   PHQ-2/PHQ-9 Depression Screening 11/14/2022   Retired PHQ-9 Total Score -   Retired Total Score -   Little Interest or Pleasure in Doing Things 0-->not at all   Feeling Down, Depressed or Hopeless 0-->not at all   PHQ-9: Brief Depression Severity Measure Score 0         Functional and Cognitive Screening:  Functional & Cognitive Status 11/14/2022   Do you have difficulty preparing food and eating? No   Do you have difficulty bathing yourself, getting dressed or grooming yourself? No   Do you have difficulty using the toilet? No   Do you have difficulty moving around from place to place? No   Do you have trouble with steps or getting out of a bed or a chair? No   Current Diet Well Balanced Diet   Dental Exam Up to date   Eye Exam Up to date   Exercise (times per week) 4 times per week   Current Exercises Include Yard Work;Walking   Current Exercise Activities Include -   Do you need help using the phone?  No   Are you deaf or do you have serious difficulty hearing?  Yes   Do you need help with transportation? No   Do you need help shopping? No   Do you need help preparing meals?  No   Do you need help with housework?  No   Do you need help with laundry? No   Do you need help taking your medications? No   Do you need help managing money? No   Do you ever drive or ride in a car without wearing a seat belt? No   Have you felt unusual stress, anger or loneliness in the last month? No   Who do you live with? Alone   If you need help, do you have trouble finding someone available to you? No   Have you been bothered in the last four weeks by sexual problems? No   Do you have difficulty concentrating, remembering or making decisions? No       Does the patient have evidence of  "cognitive impairment? No    Advanced Care Planning:  ACP discussion was held with the patient during this visit. Patient does not have an advance directive, declines further assistance.    Identification of Risk Factors:  Risk factors include: Advance Directive Discussion  Fall Risk.    Review of Systems   Constitutional: Negative for diaphoresis and fatigue.   Respiratory: Negative for shortness of breath.    Psychiatric/Behavioral: Negative for self-injury and sleep disturbance. The patient is not nervous/anxious.    All other systems reviewed and are negative.      Compared to one year ago, the patient feels his physical health is better.  Compared to one year ago, the patient feels his mental health is better.    Objective     Physical Exam  HENT:      Head: Normocephalic.      Right Ear: Tympanic membrane normal.      Left Ear: Tympanic membrane normal.      Nose: Nose normal.      Mouth/Throat:      Mouth: Mucous membranes are moist.   Eyes:      Pupils: Pupils are equal, round, and reactive to light.   Cardiovascular:      Rate and Rhythm: Normal rate.      Pulses: Normal pulses.   Pulmonary:      Effort: Pulmonary effort is normal.   Abdominal:      General: Bowel sounds are normal.   Musculoskeletal:         General: Normal range of motion.      Cervical back: Normal range of motion.   Skin:     General: Skin is warm.      Capillary Refill: Capillary refill takes less than 2 seconds.   Neurological:      General: No focal deficit present.      Mental Status: He is alert.   Psychiatric:         Mood and Affect: Mood normal.         Vitals:    11/14/22 1330   BP: 110/70   Pulse: 65   Resp: 16   Temp: 97.8 °F (36.6 °C)   SpO2: 98%   Weight: 91.2 kg (201 lb)   Height: 185.4 cm (73\")   PainSc:   2       Body mass index is 26.52 kg/m².  Discussed the patient's BMI with him. The BMI is in the acceptable range.    Assessment & Plan   Patient Self-Management and Personalized Health Advice  The patient has been " provided with information about: diet, exercise and weight management and preventive services including:   · Annual Wellness Visit (AWV).    Visit Diagnoses:    ICD-10-CM ICD-9-CM   1. Skin lesion  L98.9 709.9   2. Urge incontinence of urine  N39.41 788.31   3. Other insomnia  G47.09 780.52   4. Multiple acquired skin tags  L91.8 701.9   5. Abnormal findings on diagnostic imaging of other parts of digestive tract  R93.3 793.4   6. Encounter for screening for malignant neoplasm of prostate  Z12.5 V76.44       Orders Placed This Encounter   Procedures   • Lipid Panel   • Vitamin B12     Order Specific Question:   Release to patient     Answer:   Routine Release   • Comprehensive Metabolic Panel     Order Specific Question:   Release to patient     Answer:   Routine Release   • TSH     Order Specific Question:   Release to patient     Answer:   Routine Release   • T4, Free     Order Specific Question:   Release to patient     Answer:   Routine Release   • Ambulatory Referral to Dermatology     Referral Priority:   Routine     Referral Type:   Consultation     Referral Reason:   Specialty Services Required     Referred to Provider:   Luan Hassan MD     Requested Specialty:   Dermatology     Number of Visits Requested:   1   • CBC & Differential     Order Specific Question:   Manual Differential     Answer:   No       Outpatient Encounter Medications as of 11/14/2022   Medication Sig Dispense Refill   • amLODIPine (NORVASC) 10 MG tablet TAKE 1 TABLET BY MOUTH EVERY DAY 90 tablet 3   • ASPIRIN LOW DOSE 81 MG EC tablet TAKE 1 TABLET BY MOUTH ONCE DAILY 90 tablet 3   • atorvastatin (LIPITOR) 10 MG tablet TAKE 1 TABLET BY MOUTH EVERY DAY 90 tablet 3   • calcium carbonate (OS-LESLY) 600 MG tablet Take 600 mg by mouth Daily.     • fluticasone (FLONASE) 50 MCG/ACT nasal spray 1 spray into the nostril(s) as directed by provider As Needed.     • lisinopril (PRINIVIL,ZESTRIL) 10 MG tablet TAKE 1 TABLET BY MOUTH EVERY DAY 90  tablet 3   • Mirabegron ER (Myrbetriq) 25 MG tablet sustained-release 24 hour 24 hr tablet Take 1 tablet by mouth Daily. 30 tablet 11   • multivitamin (THERAGRAN) tablet tablet Take  by mouth Daily.     • naproxen (NAPROSYN) 500 MG tablet Take 1 tablet by mouth 2 (Two) Times a Day With Meals. (Patient taking differently: Take 1 tablet by mouth As Needed.) 60 tablet 5   • traZODone (DESYREL) 50 MG tablet Take 1 tablet by mouth Every Night. 30 tablet 4   • vitamin C (ASCORBIC ACID) 250 MG tablet Take 250 mg by mouth Daily.     • [DISCONTINUED] traZODone (DESYREL) 50 MG tablet Take 1 tablet by mouth Every Night. (Patient taking differently: Take 1 tablet by mouth As Needed.) 30 tablet 4     No facility-administered encounter medications on file as of 11/14/2022.       Reviewed use of high risk medication in the elderly: no  Reviewed for potential of harmful drug interactions in the elderly: no    Follow Up:  Return in about 1 year (around 11/14/2023) for Medicare Wellness.     An After Visit Summary and PPPS with all of these plans were given to the patient.             Diagnoses and all orders for this visit:    1. Skin lesion (Primary)  -     Ambulatory Referral to Dermatology  -     Lipid Panel  -     CBC & Differential  -     Vitamin B12  -     Comprehensive Metabolic Panel  -     TSH  -     T4, Free    2. Urge incontinence of urine  -     Lipid Panel  -     CBC & Differential  -     Vitamin B12  -     Comprehensive Metabolic Panel  -     TSH  -     T4, Free    3. Other insomnia  -     traZODone (DESYREL) 50 MG tablet; Take 1 tablet by mouth Every Night.  Dispense: 30 tablet; Refill: 4  -     Lipid Panel  -     CBC & Differential  -     Vitamin B12  -     Comprehensive Metabolic Panel  -     TSH  -     T4, Free    4. Multiple acquired skin tags  -     Ambulatory Referral to Dermatology  -     Lipid Panel  -     CBC & Differential  -     Vitamin B12  -     Comprehensive Metabolic Panel  -     TSH  -     T4,  Free    5. Abnormal findings on diagnostic imaging of other parts of digestive tract  -     Lipid Panel    6. Encounter for screening for malignant neoplasm of prostate

## 2022-11-16 LAB
ALBUMIN SERPL-MCNC: 4.4 G/DL (ref 3.5–5.2)
ALBUMIN/GLOB SERPL: 1.7 G/DL
ALP SERPL-CCNC: 112 U/L (ref 39–117)
ALT SERPL-CCNC: 32 U/L (ref 1–41)
AST SERPL-CCNC: 23 U/L (ref 1–40)
BASOPHILS # BLD AUTO: 0.05 10*3/MM3 (ref 0–0.2)
BASOPHILS NFR BLD AUTO: 0.4 % (ref 0–1.5)
BILIRUB SERPL-MCNC: 0.8 MG/DL (ref 0–1.2)
BUN SERPL-MCNC: 17 MG/DL (ref 8–23)
BUN/CREAT SERPL: 14.5 (ref 7–25)
CALCIUM SERPL-MCNC: 8.9 MG/DL (ref 8.6–10.5)
CHLORIDE SERPL-SCNC: 105 MMOL/L (ref 98–107)
CHOLEST SERPL-MCNC: 142 MG/DL (ref 0–200)
CO2 SERPL-SCNC: 26.4 MMOL/L (ref 22–29)
CREAT SERPL-MCNC: 1.17 MG/DL (ref 0.76–1.27)
EGFRCR SERPLBLD CKD-EPI 2021: 68.3 ML/MIN/1.73
EOSINOPHIL # BLD AUTO: 0.18 10*3/MM3 (ref 0–0.4)
EOSINOPHIL NFR BLD AUTO: 1.3 % (ref 0.3–6.2)
ERYTHROCYTE [DISTWIDTH] IN BLOOD BY AUTOMATED COUNT: 12.7 % (ref 12.3–15.4)
GLOBULIN SER CALC-MCNC: 2.6 GM/DL
GLUCOSE SERPL-MCNC: 100 MG/DL (ref 65–99)
HCT VFR BLD AUTO: 46.2 % (ref 37.5–51)
HDLC SERPL-MCNC: 56 MG/DL (ref 40–60)
HGB BLD-MCNC: 15.2 G/DL (ref 13–17.7)
IMM GRANULOCYTES # BLD AUTO: 0.06 10*3/MM3 (ref 0–0.05)
IMM GRANULOCYTES NFR BLD AUTO: 0.4 % (ref 0–0.5)
LDLC SERPL CALC-MCNC: 72 MG/DL (ref 0–100)
LYMPHOCYTES # BLD AUTO: 1.56 10*3/MM3 (ref 0.7–3.1)
LYMPHOCYTES NFR BLD AUTO: 11 % (ref 19.6–45.3)
MCH RBC QN AUTO: 28.5 PG (ref 26.6–33)
MCHC RBC AUTO-ENTMCNC: 32.9 G/DL (ref 31.5–35.7)
MCV RBC AUTO: 86.5 FL (ref 79–97)
MONOCYTES # BLD AUTO: 0.89 10*3/MM3 (ref 0.1–0.9)
MONOCYTES NFR BLD AUTO: 6.3 % (ref 5–12)
NEUTROPHILS # BLD AUTO: 11.47 10*3/MM3 (ref 1.7–7)
NEUTROPHILS NFR BLD AUTO: 80.6 % (ref 42.7–76)
NRBC BLD AUTO-RTO: 0 /100 WBC (ref 0–0.2)
PLATELET # BLD AUTO: 278 10*3/MM3 (ref 140–450)
POTASSIUM SERPL-SCNC: 4.5 MMOL/L (ref 3.5–5.2)
PROT SERPL-MCNC: 7 G/DL (ref 6–8.5)
RBC # BLD AUTO: 5.34 10*6/MM3 (ref 4.14–5.8)
SODIUM SERPL-SCNC: 138 MMOL/L (ref 136–145)
T4 FREE SERPL-MCNC: 1.17 NG/DL (ref 0.93–1.7)
TRIGL SERPL-MCNC: 68 MG/DL (ref 0–150)
TSH SERPL DL<=0.005 MIU/L-ACNC: 3.56 UIU/ML (ref 0.27–4.2)
VIT B12 SERPL-MCNC: 727 PG/ML (ref 211–946)
VLDLC SERPL CALC-MCNC: 14 MG/DL (ref 5–40)
WBC # BLD AUTO: 14.21 10*3/MM3 (ref 3.4–10.8)

## 2022-11-17 DIAGNOSIS — N39.41 URGE INCONTINENCE OF URINE: Primary | ICD-10-CM

## 2022-11-18 LAB
APPEARANCE UR: CLEAR
BACTERIA #/AREA URNS HPF: NORMAL /HPF
BILIRUB UR QL STRIP: NEGATIVE
CASTS URNS MICRO: NORMAL
COLOR UR: YELLOW
EPI CELLS #/AREA URNS HPF: NORMAL /HPF
GLUCOSE UR QL STRIP: NEGATIVE
HGB UR QL STRIP: NEGATIVE
KETONES UR QL STRIP: NEGATIVE
LEUKOCYTE ESTERASE UR QL STRIP: NEGATIVE
NITRITE UR QL STRIP: NEGATIVE
PH UR STRIP: 6 [PH] (ref 5–8)
PROT UR QL STRIP: NEGATIVE
RBC #/AREA URNS HPF: NORMAL /HPF
SP GR UR STRIP: 1.02 (ref 1–1.03)
UROBILINOGEN UR STRIP-MCNC: NORMAL MG/DL
WBC #/AREA URNS HPF: NORMAL /HPF

## 2022-11-30 ENCOUNTER — TELEPHONE (OUTPATIENT)
Dept: UROLOGY | Facility: CLINIC | Age: 67
End: 2022-11-30

## 2022-11-30 NOTE — TELEPHONE ENCOUNTER
Caller: SLY SUAREZ         Best call back number: [phone]    Patient is needing: PT NEEDS TO R/S 12/6 PROCEDURE. PLEASE CALL PT TO R/S

## 2023-01-10 ENCOUNTER — OUTSIDE FACILITY SERVICE (OUTPATIENT)
Dept: UROLOGY | Facility: CLINIC | Age: 68
End: 2023-01-10
Payer: MEDICARE

## 2023-01-10 DIAGNOSIS — R39.9 LOWER URINARY TRACT SYMPTOMS (LUTS): Primary | ICD-10-CM

## 2023-01-10 PROCEDURE — 52601 PROSTATECTOMY (TURP): CPT | Performed by: UROLOGY

## 2023-01-10 RX ORDER — SULFAMETHOXAZOLE AND TRIMETHOPRIM 800; 160 MG/1; MG/1
1 TABLET ORAL 2 TIMES DAILY
Qty: 6 TABLET | Refills: 0 | Status: SHIPPED | OUTPATIENT
Start: 2023-01-10 | End: 2023-02-14

## 2023-01-10 RX ORDER — PHENAZOPYRIDINE HYDROCHLORIDE 100 MG/1
100 TABLET, FILM COATED ORAL 3 TIMES DAILY PRN
Qty: 21 TABLET | Refills: 0 | Status: SHIPPED | OUTPATIENT
Start: 2023-01-10 | End: 2023-02-14

## 2023-01-10 RX ORDER — ACETAMINOPHEN 500 MG
1000 TABLET ORAL EVERY 6 HOURS
Qty: 30 TABLET | Refills: 0 | Status: SHIPPED | OUTPATIENT
Start: 2023-01-10 | End: 2023-01-13

## 2023-01-11 ENCOUNTER — OFFICE VISIT (OUTPATIENT)
Dept: UROLOGY | Facility: CLINIC | Age: 68
End: 2023-01-11
Payer: MEDICARE

## 2023-01-11 DIAGNOSIS — R33.9 URINARY RETENTION: ICD-10-CM

## 2023-01-11 PROCEDURE — 51700 IRRIGATION OF BLADDER: CPT | Performed by: UROLOGY

## 2023-01-11 PROCEDURE — 51798 US URINE CAPACITY MEASURE: CPT | Performed by: UROLOGY

## 2023-01-11 NOTE — PROGRESS NOTES
Patient in for fill n void trial post surgery. He was filled with 220ml of sterile water, catheter was removed and patient was able to void 200ml with PVR of 0ml. He was able to leave without cath and advised to return to clinic if unable to void later on today.  NC

## 2023-01-13 ENCOUNTER — TELEPHONE (OUTPATIENT)
Dept: INTERNAL MEDICINE | Facility: CLINIC | Age: 68
End: 2023-01-13
Payer: MEDICARE

## 2023-01-13 RX ORDER — DEXTROMETHORPHAN HYDROBROMIDE AND PROMETHAZINE HYDROCHLORIDE 15; 6.25 MG/5ML; MG/5ML
5 SYRUP ORAL 4 TIMES DAILY PRN
Qty: 240 ML | Refills: 0 | Status: SHIPPED | OUTPATIENT
Start: 2023-01-13 | End: 2023-02-14

## 2023-01-13 NOTE — TELEPHONE ENCOUNTER
Caller: Seng Seymour    Relationship to patient: Self     Best call back number: [phone]    Date of positive COVID19 test: 1/13/23-AT HOME TEST      COVID19 symptoms: CONGESTION, COUGH    Additional information or concerns: PATIENT IS ASKING FOR ADVISEMENT    What is the patients preferred pharmacy: [facility]

## 2023-02-14 ENCOUNTER — OFFICE VISIT (OUTPATIENT)
Dept: UROLOGY | Facility: CLINIC | Age: 68
End: 2023-02-14
Payer: MEDICARE

## 2023-02-14 VITALS
BODY MASS INDEX: 26.64 KG/M2 | WEIGHT: 201 LBS | DIASTOLIC BLOOD PRESSURE: 64 MMHG | SYSTOLIC BLOOD PRESSURE: 112 MMHG | HEIGHT: 73 IN | OXYGEN SATURATION: 97 % | HEART RATE: 62 BPM | TEMPERATURE: 96.3 F

## 2023-02-14 DIAGNOSIS — R39.9 LOWER URINARY TRACT SYMPTOMS (LUTS): ICD-10-CM

## 2023-02-14 DIAGNOSIS — R32 URINARY INCONTINENCE, UNSPECIFIED TYPE: Primary | ICD-10-CM

## 2023-02-14 LAB
BILIRUB BLD-MCNC: NEGATIVE MG/DL
CLARITY, POC: ABNORMAL
COLOR UR: ABNORMAL
EXPIRATION DATE: ABNORMAL
GLUCOSE UR STRIP-MCNC: NEGATIVE MG/DL
KETONES UR QL: NEGATIVE
LEUKOCYTE EST, POC: ABNORMAL
Lab: ABNORMAL
NITRITE UR-MCNC: NEGATIVE MG/ML
PH UR: 6 [PH] (ref 5–8)
PROT UR STRIP-MCNC: ABNORMAL MG/DL
RBC # UR STRIP: ABNORMAL /UL
SP GR UR: 1.02 (ref 1–1.03)
UROBILINOGEN UR QL: NORMAL

## 2023-02-14 PROCEDURE — 99024 POSTOP FOLLOW-UP VISIT: CPT | Performed by: PHYSICIAN ASSISTANT

## 2023-02-14 PROCEDURE — 51798 US URINE CAPACITY MEASURE: CPT | Performed by: PHYSICIAN ASSISTANT

## 2023-02-14 PROCEDURE — 81003 URINALYSIS AUTO W/O SCOPE: CPT | Performed by: PHYSICIAN ASSISTANT

## 2023-02-14 NOTE — PROGRESS NOTES
Chief Complaint   Patient presents with   • Post-op Follow-up     1 month post op TURP        HPI  Mr. Seymour is a 67 y.o. male with history of BPH s/p TURP 01/10/23, elevated PSA who presents for follow up.     At this visit, is most concerned with ongoing UUI. Still has intermittent, mild dysuria. States it took about 3.5 weeks for gross hematuria to resolve.     IPSS Questionnaire (AUA-7):  Over the past month…    1)  Incomplete Emptying  How often have you had a sensation of not emptying your bladder?  1 - Less than 1 time in 5   2)  Frequency  How often have you had to urinate less than every two hours? 1 - Less than 1 time in 5   3)  Intermittency  How often have you found you stopped and started again several times when you urinated?  1 - Less than 1 time in 5   4) Urgency  How often have you found it difficult to postpone urination?  3 - About half the time   5) Weak Stream  How often have you had a weak urinary stream?  0 - Not at all   6) Straining  How often have you had to push or strain to begin urination?  0 - Not at all   7) Nocturia  How many times did you typically get up at night to urinate?  3 - 3 times   Total Score:  9       Quality of life due to urinary symptoms:  If you were to spend the rest of your life with your urinary condition the way it is now, how would you feel about that? 2-Mostly Satisfied   Urine Leakage (Incontinence) 0-No Leakage         Past Medical History:   Diagnosis Date   • Ankylosing spondylitis (HCC)    • Benign prostatic hyperplasia 2022   • Hyperlipidemia    • Hypertension        Past Surgical History:   Procedure Laterality Date   • COLONOSCOPY     • CYSTOSCOPY TRANSURETHRAL RESECTION OF PROSTATE  01/10/2023   • HERNIA REPAIR     • NASAL SEPTAL RECONSTRUCTION           Current Outpatient Medications:   •  amLODIPine (NORVASC) 10 MG tablet, TAKE 1 TABLET BY MOUTH EVERY DAY, Disp: 90 tablet, Rfl: 3  •  ASPIRIN LOW DOSE 81 MG EC tablet, TAKE 1 TABLET BY MOUTH ONCE DAILY,  "Disp: 90 tablet, Rfl: 3  •  atorvastatin (LIPITOR) 10 MG tablet, TAKE 1 TABLET BY MOUTH EVERY DAY, Disp: 90 tablet, Rfl: 3  •  calcium carbonate (OS-LESLY) 600 MG tablet, Take 600 mg by mouth Daily., Disp: , Rfl:   •  fluticasone (FLONASE) 50 MCG/ACT nasal spray, 1 spray into the nostril(s) as directed by provider As Needed., Disp: , Rfl:   •  lisinopril (PRINIVIL,ZESTRIL) 10 MG tablet, TAKE 1 TABLET BY MOUTH EVERY DAY, Disp: 90 tablet, Rfl: 3  •  Mirabegron ER (Myrbetriq) 25 MG tablet sustained-release 24 hour 24 hr tablet, Take 1 tablet by mouth Daily., Disp: 30 tablet, Rfl: 11  •  multivitamin (THERAGRAN) tablet tablet, Take  by mouth Daily., Disp: , Rfl:   •  naproxen (NAPROSYN) 500 MG tablet, Take 1 tablet by mouth 2 (Two) Times a Day With Meals. (Patient taking differently: Take 500 mg by mouth As Needed.), Disp: 60 tablet, Rfl: 5  •  traZODone (DESYREL) 50 MG tablet, Take 1 tablet by mouth Every Night., Disp: 30 tablet, Rfl: 4  •  vitamin C (ASCORBIC ACID) 250 MG tablet, Take 250 mg by mouth Daily., Disp: , Rfl:      Physical Exam  Visit Vitals  /64 (BP Location: Left arm, Patient Position: Sitting, Cuff Size: Adult)   Pulse 62   Temp 96.3 °F (35.7 °C) (Temporal)   Ht 185.4 cm (73\") Comment: patient recorded   Wt 91.2 kg (201 lb)   SpO2 97%   BMI 26.52 kg/m²       Labs  Brief Urine Lab Results  (Last result in the past 365 days)      Color   Clarity   Blood   Leuk Est   Nitrite   Protein   CREAT   Urine HCG        02/14/23 0934 Orange   Cloudy   3+   Large (3+)   Negative   3+                 Lab Results   Component Value Date    GLUCOSE 100 (H) 11/15/2022    CALCIUM 8.9 11/15/2022     11/15/2022    K 4.5 11/15/2022    CO2 26.4 11/15/2022     11/15/2022    BUN 17 11/15/2022    CREATININE 1.17 11/15/2022    EGFRIFAFRI 91 03/29/2021    EGFRIFNONA 75 03/29/2021    BCR 14.5 11/15/2022    ANIONGAP 17.0 (H) 11/30/2016       Lab Results   Component Value Date    WBC 14.21 (H) 11/15/2022    HGB 15.2 " 11/15/2022    HCT 46.2 11/15/2022    MCV 86.5 11/15/2022     11/15/2022            Lab Results   Component Value Date    PSA 4.610 (H) 01/21/2022    PSA 4.380 (H) 03/29/2021    PSA 4.4 (H) 12/29/2020         PVR  Post-void residual performed with ultrasound scanner by staff and interpreted by me - 0cc    I have reviewed above labs and imaging.     Assessment  67 y.o. male with hx of BPH s/p TURP 01/10/2023 and elevated PSA (4K score 3%).     IPSS 11->9. Still taking myrbetriq 25mg, experiencing irritative voiding symptoms and I recommend increasing the dose of this at least temporarily.  His UA reveals blood and pyuria, though he is not experiencing dysuria.  He took his postoperative Bactrim.  I will send this for culture for further investigation and the patient will call us for any worsening of symptoms or development of gross hematuria.    Regarding elevated PSA, the patient has a very low risk 4K score and pathology from TURP specimens reveals mostly benign tissue with occasional mixed inflammatory infiltrate.  No malignancy.                Plan  1.  Follow-up in 8 to 12 weeks for reassessment of symptoms  2.  Obtain urine PCR  3.  Start Myrbetriq 50 mg, samples provided today  4.  Discussed resuming PSA testing at follow-up

## 2023-02-16 LAB
BACTERIA UR CULT: NO GROWTH
BACTERIA UR CULT: NORMAL

## 2023-02-17 ENCOUNTER — DOCUMENTATION (OUTPATIENT)
Dept: UROLOGY | Facility: CLINIC | Age: 68
End: 2023-02-17
Payer: MEDICARE

## 2023-02-17 NOTE — PROGRESS NOTES
[facility] Urinary PCR was collected on 02/14/23 and revealed no organisms.    Results will be scanned into the patient's media tab.

## 2023-02-21 ENCOUNTER — DOCUMENTATION (OUTPATIENT)
Dept: UROLOGY | Facility: CLINIC | Age: 68
End: 2023-02-21
Payer: MEDICARE

## 2023-02-21 NOTE — PROGRESS NOTES
Patient has been notified of benign PCR results showing no organisms or resistance genes. Patient understood report and has follow up appt in May. Patient will call our office if symptoms appear before next appointment.

## 2023-06-03 DIAGNOSIS — I10 ESSENTIAL HYPERTENSION: ICD-10-CM

## 2023-06-03 DIAGNOSIS — E78.00 HYPERCHOLESTEROLEMIA: ICD-10-CM

## 2023-06-05 RX ORDER — ATORVASTATIN CALCIUM 10 MG/1
TABLET, FILM COATED ORAL
Qty: 90 TABLET | Refills: 3 | Status: SHIPPED | OUTPATIENT
Start: 2023-06-05

## 2023-06-05 RX ORDER — LISINOPRIL 10 MG/1
TABLET ORAL
Qty: 90 TABLET | Refills: 3 | Status: SHIPPED | OUTPATIENT
Start: 2023-06-05

## 2023-09-01 DIAGNOSIS — I10 ESSENTIAL HYPERTENSION: ICD-10-CM

## 2023-09-01 RX ORDER — AMLODIPINE BESYLATE 10 MG/1
TABLET ORAL
Qty: 90 TABLET | Refills: 3 | OUTPATIENT
Start: 2023-09-01

## 2023-10-16 DIAGNOSIS — I10 ESSENTIAL HYPERTENSION: ICD-10-CM

## 2023-10-16 RX ORDER — AMLODIPINE BESYLATE 10 MG/1
TABLET ORAL
Qty: 90 TABLET | Refills: 3 | Status: SHIPPED | OUTPATIENT
Start: 2023-10-16

## 2023-10-30 ENCOUNTER — TELEPHONE (OUTPATIENT)
Dept: INTERNAL MEDICINE | Facility: CLINIC | Age: 68
End: 2023-10-30
Payer: MEDICARE

## 2023-10-30 DIAGNOSIS — E55.9 VITAMIN D DEFICIENCY, UNSPECIFIED: ICD-10-CM

## 2023-10-30 DIAGNOSIS — Z12.5 PROSTATE CANCER SCREENING: Primary | ICD-10-CM

## 2023-10-30 DIAGNOSIS — E78.00 HYPERCHOLESTEROLEMIA: ICD-10-CM

## 2023-10-30 DIAGNOSIS — R93.3 ABNORMAL FINDINGS ON DIAGNOSTIC IMAGING OF OTHER PARTS OF DIGESTIVE TRACT: ICD-10-CM

## 2023-10-30 DIAGNOSIS — I10 ESSENTIAL HYPERTENSION: ICD-10-CM

## 2023-10-30 DIAGNOSIS — N40.1 BENIGN PROSTATIC HYPERPLASIA WITH NOCTURIA: ICD-10-CM

## 2023-10-30 DIAGNOSIS — R35.1 BENIGN PROSTATIC HYPERPLASIA WITH NOCTURIA: ICD-10-CM

## 2023-10-30 DIAGNOSIS — R79.9 ABNORMAL FINDING OF BLOOD CHEMISTRY, UNSPECIFIED: ICD-10-CM

## 2023-10-30 NOTE — TELEPHONE ENCOUNTER
PT WOULD LIKE TO KNOW IF HIS BLOOD WORK WILL BE ORDERED BEFORE HIS WELLNESS VISIT IN DECEMBER. PLEASE ADVISE.

## 2023-11-17 LAB
25(OH)D3+25(OH)D2 SERPL-MCNC: 30.9 NG/ML (ref 30–100)
ALBUMIN SERPL-MCNC: 4.6 G/DL (ref 3.5–5.2)
ALBUMIN/GLOB SERPL: 2.2 G/DL
ALP SERPL-CCNC: 107 U/L (ref 39–117)
ALT SERPL-CCNC: 33 U/L (ref 1–41)
AST SERPL-CCNC: 22 U/L (ref 1–40)
BASOPHILS # BLD AUTO: 0.06 10*3/MM3 (ref 0–0.2)
BASOPHILS NFR BLD AUTO: 0.5 % (ref 0–1.5)
BILIRUB SERPL-MCNC: 0.8 MG/DL (ref 0–1.2)
BUN SERPL-MCNC: 15 MG/DL (ref 8–23)
BUN/CREAT SERPL: 15 (ref 7–25)
CALCIUM SERPL-MCNC: 9.4 MG/DL (ref 8.6–10.5)
CHLORIDE SERPL-SCNC: 105 MMOL/L (ref 98–107)
CHOLEST SERPL-MCNC: 132 MG/DL (ref 0–200)
CO2 SERPL-SCNC: 26 MMOL/L (ref 22–29)
CREAT SERPL-MCNC: 1 MG/DL (ref 0.76–1.27)
EGFRCR SERPLBLD CKD-EPI 2021: 82 ML/MIN/1.73
EOSINOPHIL # BLD AUTO: 0.11 10*3/MM3 (ref 0–0.4)
EOSINOPHIL NFR BLD AUTO: 1 % (ref 0.3–6.2)
ERYTHROCYTE [DISTWIDTH] IN BLOOD BY AUTOMATED COUNT: 12.2 % (ref 12.3–15.4)
GLOBULIN SER CALC-MCNC: 2.1 GM/DL
GLUCOSE SERPL-MCNC: 112 MG/DL (ref 65–99)
HBA1C MFR BLD: 5.4 % (ref 4.8–5.6)
HCT VFR BLD AUTO: 45.8 % (ref 37.5–51)
HDLC SERPL-MCNC: 44 MG/DL (ref 40–60)
HGB BLD-MCNC: 15.7 G/DL (ref 13–17.7)
IMM GRANULOCYTES # BLD AUTO: 0.06 10*3/MM3 (ref 0–0.05)
IMM GRANULOCYTES NFR BLD AUTO: 0.5 % (ref 0–0.5)
LDLC SERPL CALC-MCNC: 71 MG/DL (ref 0–100)
LYMPHOCYTES # BLD AUTO: 1.39 10*3/MM3 (ref 0.7–3.1)
LYMPHOCYTES NFR BLD AUTO: 12.7 % (ref 19.6–45.3)
MCH RBC QN AUTO: 29.1 PG (ref 26.6–33)
MCHC RBC AUTO-ENTMCNC: 34.3 G/DL (ref 31.5–35.7)
MCV RBC AUTO: 84.8 FL (ref 79–97)
MONOCYTES # BLD AUTO: 0.67 10*3/MM3 (ref 0.1–0.9)
MONOCYTES NFR BLD AUTO: 6.1 % (ref 5–12)
NEUTROPHILS # BLD AUTO: 8.63 10*3/MM3 (ref 1.7–7)
NEUTROPHILS NFR BLD AUTO: 79.2 % (ref 42.7–76)
NRBC BLD AUTO-RTO: 0.1 /100 WBC (ref 0–0.2)
PLATELET # BLD AUTO: 282 10*3/MM3 (ref 140–450)
POTASSIUM SERPL-SCNC: 4.2 MMOL/L (ref 3.5–5.2)
PROT SERPL-MCNC: 6.7 G/DL (ref 6–8.5)
PSA SERPL-MCNC: 4.01 NG/ML (ref 0–4)
RBC # BLD AUTO: 5.4 10*6/MM3 (ref 4.14–5.8)
SODIUM SERPL-SCNC: 141 MMOL/L (ref 136–145)
T4 FREE SERPL-MCNC: 1.23 NG/DL (ref 0.93–1.7)
TRIGL SERPL-MCNC: 87 MG/DL (ref 0–150)
TSH SERPL DL<=0.005 MIU/L-ACNC: 2.8 UIU/ML (ref 0.27–4.2)
UNABLE TO VOID: NORMAL
VIT B12 SERPL-MCNC: 788 PG/ML (ref 211–946)
VLDLC SERPL CALC-MCNC: 17 MG/DL (ref 5–40)
WBC # BLD AUTO: 10.92 10*3/MM3 (ref 3.4–10.8)

## 2023-12-06 ENCOUNTER — OFFICE VISIT (OUTPATIENT)
Dept: INTERNAL MEDICINE | Facility: CLINIC | Age: 68
End: 2023-12-06
Payer: MEDICARE

## 2023-12-06 VITALS
TEMPERATURE: 96.7 F | RESPIRATION RATE: 16 BRPM | WEIGHT: 209 LBS | DIASTOLIC BLOOD PRESSURE: 80 MMHG | BODY MASS INDEX: 27.7 KG/M2 | OXYGEN SATURATION: 98 % | HEART RATE: 54 BPM | SYSTOLIC BLOOD PRESSURE: 118 MMHG | HEIGHT: 73 IN

## 2023-12-06 DIAGNOSIS — M47.816 ARTHRITIS OF LUMBAR SPINE: Primary | ICD-10-CM

## 2023-12-06 DIAGNOSIS — Z00.00 ROUTINE GENERAL MEDICAL EXAMINATION AT A HEALTH CARE FACILITY: ICD-10-CM

## 2023-12-06 RX ORDER — NAPROXEN 500 MG/1
500 TABLET ORAL 2 TIMES DAILY WITH MEALS
Qty: 60 TABLET | Refills: 5 | Status: SHIPPED | OUTPATIENT
Start: 2023-12-06

## 2023-12-06 NOTE — PROGRESS NOTES
The ABCs of the Annual Wellness Visit  Subsequent Medicare Wellness Visit    Subjective      Seng Seymour is a 68 y.o. male who presents for a Subsequent Medicare Wellness Visit.    The following portions of the patient's history were reviewed and   updated as appropriate: allergies, current medications, past family history, past medical history, past social history, past surgical history, and problem list.    Compared to one year ago, the patient feels his physical   health is the same.    Compared to one year ago, the patient feels his mental   health is the same.    Recent Hospitalizations:  He was not admitted to the hospital during the last year.       Current Medical Providers:  Patient Care Team:  Kei Jarrett MD as PCP - General (Internal Medicine)  Myke Rodríguez MD as Consulting Physician (Urology)  Robi Jain IV, MD as Consulting Physician (Interventional Cardiology)    Outpatient Medications Prior to Visit   Medication Sig Dispense Refill    amLODIPine (NORVASC) 10 MG tablet TAKE 1 TABLET BY MOUTH EVERY DAY 90 tablet 3    ASPIRIN LOW DOSE 81 MG EC tablet TAKE 1 TABLET BY MOUTH ONCE DAILY 90 tablet 3    atorvastatin (LIPITOR) 10 MG tablet TAKE 1 TABLET BY MOUTH EVERY DAY 90 tablet 3    fluticasone (FLONASE) 50 MCG/ACT nasal spray 1 spray into the nostril(s) as directed by provider As Needed.      ketoconazole (NIZORAL) 2 % cream APPLY TOPICALLY TO THE AFFECTED AREA OF FACE TWICE DAILY UNTIL GONE      lisinopril (PRINIVIL,ZESTRIL) 10 MG tablet TAKE 1 TABLET BY MOUTH EVERY DAY 90 tablet 3    multivitamin (THERAGRAN) tablet tablet Take  by mouth Daily.      traZODone (DESYREL) 50 MG tablet Take 1 tablet by mouth Every Night. 30 tablet 4    vitamin C (ASCORBIC ACID) 250 MG tablet Take 1 tablet by mouth Daily.      naproxen (NAPROSYN) 500 MG tablet Take 1 tablet by mouth 2 (Two) Times a Day With Meals. (Patient taking differently: Take 1 tablet by mouth As Needed.) 60 tablet 5  "   calcium carbonate (OS-LESLY) 600 MG tablet Take 1 tablet by mouth Daily.       No facility-administered medications prior to visit.       No opioid medication identified on active medication list. I have reviewed chart for other potential  high risk medication/s and harmful drug interactions in the elderly.        Aspirin is on active medication list. Aspirin use is indicated based on review of current medical condition/s. Pros and cons of this therapy have been discussed today. Benefits of this medication outweigh potential harm.  Patient has been encouraged to continue taking this medication.  .      Patient Active Problem List   Diagnosis    Ankylosing spondylitis    Benign prostatic hyperplasia with urinary obstruction    Erectile dysfunction of nonorganic origin    Edema    Hypercholesterolemia    Essential hypertension    Skin lesion    Neurocardiogenic syncope    Cobalamin deficiency    Diverticulosis of large intestine    Internal hemorrhage     Advance Care Planning   Advance Care Planning     Advance Directive is not on file.  ACP discussion was held with the patient during this visit. Patient does not have an advance directive, declines further assistance.     Objective    Vitals:    12/06/23 1103   BP: 118/80   Pulse: 54   Resp: 16   Temp: 96.7 °F (35.9 °C)   SpO2: 98%   Weight: 94.8 kg (209 lb)   Height: 185.4 cm (73\")   PainSc: 0-No pain     Estimated body mass index is 27.57 kg/m² as calculated from the following:    Height as of this encounter: 185.4 cm (73\").    Weight as of this encounter: 94.8 kg (209 lb).       General Appearance:    Alert, cooperative, no distress, appears stated age   Head:    Normocephalic, without obvious abnormality, atraumatic   Eyes:    PERRL, conjunctiva/corneas clear, EOM's intact   Ears:    Normal TM's and external ear canals, both ears   Nose:   Nares normal, septum midline, mucosa normal, no drainage   or sinus tenderness   Throat:   Lips, mucosa, and tongue normal; " teeth and gums normal   Neck:   Supple, symmetrical, trachea midline, no adenopathy;        thyroid:  No enlargement/tenderness/nodules; no carotid    bruit or JVD   Back:     Symmetric, no curvature, ROM normal, no CVA tenderness   Lungs:     Clear to auscultation bilaterally, respirations unlabored   Chest wall:    No tenderness or deformity   Heart:    Regular rate and rhythm, S1 and S2 normal, no murmur,        rub or gallop   Abdomen:     Soft, non-tender, bowel sounds active all four quadrants,     no masses, no organomegaly   Extremities:   Extremities normal, atraumatic, no cyanosis or edema   Pulses:   2+ and symmetric all extremities   Skin:   Skin color, texture, turgor normal, no rashes or lesions   Lymph nodes:   Cervical, supraclavicular, and axillary nodes normal   Neurologic:   CNII-XII intact. Normal strength, sensation and reflexes       throughout          Does the patient have evidence of cognitive impairment?   No    Lab Results   Component Value Date    CHLPL 132 2023    TRIG 87 2023    HDL 44 2023    LDL 71 2023    VLDL 17 2023    HGBA1C 5.40 2023          HEALTH RISK ASSESSMENT    Smoking Status:  Social History     Tobacco Use   Smoking Status Never   Smokeless Tobacco Never     Alcohol Consumption:  Social History     Substance and Sexual Activity   Alcohol Use No     Fall Risk Screen:    BETTY Fall Risk Assessment was completed, and patient is at LOW risk for falls.Assessment completed on:2023    Depression Screenin/6/2023    11:06 AM   PHQ-2/PHQ-9 Depression Screening   Little Interest or Pleasure in Doing Things 0-->not at all   Feeling Down, Depressed or Hopeless 0-->not at all   PHQ-9: Brief Depression Severity Measure Score 0       Health Habits and Functional and Cognitive Screenin/6/2023    11:06 AM   Functional & Cognitive Status   Do you have difficulty preparing food and eating? No   Do you have difficulty bathing  yourself, getting dressed or grooming yourself? No   Do you have difficulty using the toilet? No   Do you have difficulty moving around from place to place? No   Do you have trouble with steps or getting out of a bed or a chair? No   Current Diet Well Balanced Diet   Dental Exam Up to date   Eye Exam Up to date   Exercise (times per week) 2 times per week   Current Exercises Include Walking;Yard Work   Do you need help using the phone?  No   Are you deaf or do you have serious difficulty hearing?  Yes   Do you need help to go to places out of walking distance? No   Do you need help shopping? No   Do you need help preparing meals?  No   Do you need help with housework?  No   Do you need help with laundry? No   Do you need help taking your medications? No   Do you need help managing money? No   Do you ever drive or ride in a car without wearing a seat belt? No   Have you felt unusual stress, anger or loneliness in the last month? No   Who do you live with? Alone   If you need help, do you have trouble finding someone available to you? No   Have you been bothered in the last four weeks by sexual problems? No   Do you have difficulty concentrating, remembering or making decisions? No       Age-appropriate Screening Schedule:  Refer to the list below for future screening recommendations based on patient's age, sex and/or medical conditions. Orders for these recommended tests are listed in the plan section. The patient has been provided with a written plan.    Health Maintenance   Topic Date Due    LIPID PANEL  11/16/2024    TDAP/TD VACCINES (3 - Td or Tdap) 12/03/2024    ANNUAL WELLNESS VISIT  12/06/2024    BMI FOLLOWUP  12/06/2024    COLORECTAL CANCER SCREENING  01/04/2028    COVID-19 Vaccine  Completed    INFLUENZA VACCINE  Completed    Pneumococcal Vaccine 65+  Completed    ZOSTER VACCINE  Completed    HEPATITIS C SCREENING  Addressed                  CMS Preventative Services Quick Reference  Risk Factors Identified  During Encounter:    Fall Risk-High or Moderate: Discussed Fall Prevention in the home    The above risks/problems have been discussed with the patient.  Pertinent information has been shared with the patient in the After Visit Summary.      Arthritis of spine. Never worked up.   Will start work up.   Limite rom on spine.        Diagnoses and all orders for this visit:    1. Arthritis of lumbar spine (Primary)  -     [place] Spotted Fever, IgM  -     [place] Spotted Fever, IgG  -     Lyme Disease, PCR - , Arm, Right  -     Ehrlichia Antibody Panel  -     Cyclic Citrul Peptide Antibody, IgG / IgA  -     Rheumatoid Factor  -     Sedimentation Rate  -     Uric Acid  -     C-reactive Protein  -     Antistreptolysin O Titer    2. Routine general medical examination at a health care facility    Other orders  -     naproxen (NAPROSYN) 500 MG tablet; Take 1 tablet by mouth 2 (Two) Times a Day With Meals.  Dispense: 60 tablet; Refill: 5        Follow Up:   Next Medicare Wellness visit to be scheduled in 1 year.      An After Visit Summary and PPPS were made available to the patient.

## 2023-12-09 LAB
A PHAGOCYTOPH IGG TITR SER IF: NEGATIVE {TITER}
A PHAGOCYTOPH IGM TITR SER IF: NEGATIVE {TITER}
ASO AB SERPL-ACNC: 25.7 IU/ML (ref 0–200)
B BURGDOR DNA SPEC QL NAA+PROBE: NEGATIVE
CCP IGA+IGG SERPL IA-ACNC: 4 UNITS (ref 0–19)
CRP SERPL-MCNC: <0.3 MG/DL (ref 0–0.5)
E CHAFFEENSIS IGG TITR SER IF: NEGATIVE {TITER}
E CHAFFEENSIS IGM TITR SER IF: NEGATIVE {TITER}
ERYTHROCYTE [SEDIMENTATION RATE] IN BLOOD BY WESTERGREN METHOD: 3 MM/HR (ref 0–20)
R RICKETTSI IGG SER QL IA: NEGATIVE
R RICKETTSI IGM SER-ACNC: 0.34 INDEX (ref 0–0.89)
RESULT COMMENT:: NORMAL
RHEUMATOID FACT SERPL-ACNC: <10 IU/ML
URATE SERPL-MCNC: 6.3 MG/DL (ref 3.4–7)

## 2023-12-11 NOTE — PROGRESS NOTES
Arthritis labs all ok except uric acid.  Avoid high fructose corn syrup, wine and beer.  Increase water.  No processed foods.  Real food only.  Milk egg cheese, veggies ect.

## 2024-04-01 ENCOUNTER — TELEPHONE (OUTPATIENT)
Dept: UROLOGY | Facility: CLINIC | Age: 69
End: 2024-04-01

## 2024-05-06 DIAGNOSIS — E78.00 HYPERCHOLESTEROLEMIA: ICD-10-CM

## 2024-05-06 RX ORDER — ATORVASTATIN CALCIUM 10 MG/1
TABLET, FILM COATED ORAL
Qty: 90 TABLET | Refills: 3 | Status: SHIPPED | OUTPATIENT
Start: 2024-05-06

## 2024-05-28 DIAGNOSIS — I10 ESSENTIAL HYPERTENSION: ICD-10-CM

## 2024-05-28 RX ORDER — LISINOPRIL 10 MG/1
TABLET ORAL
Qty: 90 TABLET | Refills: 3 | Status: SHIPPED | OUTPATIENT
Start: 2024-05-28

## 2024-06-06 ENCOUNTER — OFFICE VISIT (OUTPATIENT)
Dept: INTERNAL MEDICINE | Facility: CLINIC | Age: 69
End: 2024-06-06
Payer: MEDICARE

## 2024-06-06 VITALS
WEIGHT: 203 LBS | DIASTOLIC BLOOD PRESSURE: 60 MMHG | HEIGHT: 73 IN | RESPIRATION RATE: 16 BRPM | OXYGEN SATURATION: 96 % | HEART RATE: 58 BPM | SYSTOLIC BLOOD PRESSURE: 132 MMHG | TEMPERATURE: 97 F | BODY MASS INDEX: 26.9 KG/M2

## 2024-06-06 DIAGNOSIS — I10 ESSENTIAL HYPERTENSION: Primary | ICD-10-CM

## 2024-06-06 DIAGNOSIS — Z12.5 PROSTATE CANCER SCREENING: ICD-10-CM

## 2024-06-06 DIAGNOSIS — E78.00 HYPERCHOLESTEROLEMIA: ICD-10-CM

## 2024-06-06 DIAGNOSIS — E79.0 HYPERURICEMIA: ICD-10-CM

## 2024-06-06 PROCEDURE — 99214 OFFICE O/P EST MOD 30 MIN: CPT | Performed by: INTERNAL MEDICINE

## 2024-06-06 PROCEDURE — 1125F AMNT PAIN NOTED PAIN PRSNT: CPT | Performed by: INTERNAL MEDICINE

## 2024-06-06 PROCEDURE — 3075F SYST BP GE 130 - 139MM HG: CPT | Performed by: INTERNAL MEDICINE

## 2024-06-06 PROCEDURE — 3078F DIAST BP <80 MM HG: CPT | Performed by: INTERNAL MEDICINE

## 2024-06-06 NOTE — PROGRESS NOTES
"Subjective     Patient ID: Seng Seymour is a 69 y.o. male. Patient is here for management of multiple medical problems.     Chief Complaint   Patient presents with    Back Pain     History of Present Illness   Back pain.  Stable. No better.  Years.     Htn  Stable.          The following portions of the patient's history were reviewed and updated as appropriate: allergies, current medications, past family history, past medical history, past social history, past surgical history and problem list.    Review of Systems    Current Outpatient Medications:     amLODIPine (NORVASC) 10 MG tablet, TAKE 1 TABLET BY MOUTH EVERY DAY, Disp: 90 tablet, Rfl: 3    ASPIRIN LOW DOSE 81 MG EC tablet, TAKE 1 TABLET BY MOUTH ONCE DAILY, Disp: 90 tablet, Rfl: 3    atorvastatin (LIPITOR) 10 MG tablet, TAKE 1 TABLET BY MOUTH EVERY DAY, Disp: 90 tablet, Rfl: 3    fluticasone (FLONASE) 50 MCG/ACT nasal spray, 1 spray into the nostril(s) as directed by provider As Needed., Disp: , Rfl:     ketoconazole (NIZORAL) 2 % cream, APPLY TOPICALLY TO THE AFFECTED AREA OF FACE TWICE DAILY UNTIL GONE, Disp: , Rfl:     lisinopril (PRINIVIL,ZESTRIL) 10 MG tablet, TAKE 1 TABLET BY MOUTH EVERY DAY, Disp: 90 tablet, Rfl: 3    multivitamin (THERAGRAN) tablet tablet, Take  by mouth Daily., Disp: , Rfl:     naproxen (NAPROSYN) 500 MG tablet, Take 1 tablet by mouth 2 (Two) Times a Day With Meals., Disp: 60 tablet, Rfl: 5    traZODone (DESYREL) 50 MG tablet, Take 1 tablet by mouth Every Night., Disp: 30 tablet, Rfl: 4    vitamin C (ASCORBIC ACID) 250 MG tablet, Take 1 tablet by mouth Daily., Disp: , Rfl:     Objective      Blood pressure 132/60, pulse 58, temperature 97 °F (36.1 °C), resp. rate 16, height 185.4 cm (73\"), weight 92.1 kg (203 lb), SpO2 96%.            Physical Exam     General Appearance:    Alert, cooperative, no distress, appears stated age   Head:    Normocephalic, without obvious abnormality, atraumatic   Eyes:    PERRL, " conjunctiva/corneas clear, EOM's intact   Ears:    Normal TM's and external ear canals, both ears   Nose:   Nares normal, septum midline, mucosa normal, no drainage   or sinus tenderness   Throat:   Lips, mucosa, and tongue normal; teeth and gums normal   Neck:   Supple, symmetrical, trachea midline, no adenopathy;        thyroid:  No enlargement/tenderness/nodules; no carotid    bruit or JVD   Back:     Symmetric, no curvature, ROM normal, no CVA tenderness   Lungs:     Clear to auscultation bilaterally, respirations unlabored   Chest wall:    No tenderness or deformity   Heart:    Regular rate and rhythm, S1 and S2 normal, no murmur,        rub or gallop   Abdomen:     Soft, non-tender, bowel sounds active all four quadrants,     no masses, no organomegaly   Extremities:   Extremities normal, atraumatic, no cyanosis or edema   Pulses:   2+ and symmetric all extremities   Skin:   Skin color, texture, turgor normal, no rashes or lesions   Lymph nodes:   Cervical, supraclavicular, and axillary nodes normal   Neurologic:   CNII-XII intact. Normal strength, sensation and reflexes       throughout      Results for orders placed or performed in visit on 12/06/23   Lyme Disease, PCR - , Arm, Right    Specimen: Arm, Right   Result Value Ref Range    Lyme Disease(B.burgdorferi)PCR Negative Negative   [place] Spotted Fever, IgM    Specimen: Blood   Result Value Ref Range    RMSF IgM 0.34 0.00 - 0.89 index   [facility] Spotted Fever, IgG    Specimen: Blood   Result Value Ref Range    RMSF IgG Negative Negative   Ehrlichia Antibody Panel    Specimen: Blood   Result Value Ref Range    E. chaffeensis ([facility]) IgG Titer Negative Neg:<1:64    E. chaffeensis ([facility]) IgM Titer Negative Neg:<1:20    [facility] IgG Titer Negative Neg:<1:64    [facility] IgM Titer Negative Neg:<1:20    RESULT COMMENT: Comment    Cyclic Citrul Peptide Antibody, IgG / IgA    Specimen: Blood   Result Value Ref Range    CCP Antibodies IgG/IgA 4 0 - 19 units   Rheumatoid  Factor    Specimen: Blood   Result Value Ref Range    RA Latex Turbid <10.0 <14.0 IU/mL   Sedimentation Rate    Specimen: Blood   Result Value Ref Range    Sed Rate 3 0 - 20 mm/hr   Uric Acid    Specimen: Blood   Result Value Ref Range    Uric Acid 6.3 3.4 - 7.0 mg/dL   C-reactive Protein    Specimen: Blood   Result Value Ref Range    C-Reactive Protein <0.30 0.00 - 0.50 mg/dL   Antistreptolysin O Titer    Specimen: Blood   Result Value Ref Range    ASO 25.7 0.0 - 200.0 IU/mL         Assessment & Plan   Hyperuricemia  Joints feel fine.     Will continue with diet modification unless gettignworse.      Diagnoses and all orders for this visit:    1. Essential hypertension (Primary)  -     CBC & Differential  -     Lipid Panel  -     PSA Screen  -     Comprehensive Metabolic Panel  -     Vitamin B12  -     TSH    2. Hypercholesterolemia  -     CBC & Differential  -     Lipid Panel  -     PSA Screen  -     Comprehensive Metabolic Panel  -     Vitamin B12  -     TSH    3. Hyperuricemia  -     CBC & Differential  -     Lipid Panel  -     PSA Screen  -     Comprehensive Metabolic Panel  -     Vitamin B12  -     TSH    4. Prostate cancer screening  -     CBC & Differential  -     Lipid Panel  -     PSA Screen  -     Comprehensive Metabolic Panel  -     Vitamin B12  -     TSH       Return in about 8 months (around 2/6/2025).          There are no Patient Instructions on file for this visit.     Kei Jarrett MD    Assessment & Plan       Answers submitted by the patient for this visit:  Primary Reason for Visit (Submitted on 5/30/2024)  What is the primary reason for your visit?: Back Pain

## 2024-06-20 ENCOUNTER — TELEPHONE (OUTPATIENT)
Dept: UROLOGY | Facility: CLINIC | Age: 69
End: 2024-06-20
Payer: MEDICARE

## 2024-06-20 DIAGNOSIS — R97.20 ELEVATED PROSTATE SPECIFIC ANTIGEN (PSA): Primary | ICD-10-CM

## 2024-06-20 NOTE — TELEPHONE ENCOUNTER
I tried calling patient bu no phone numbers are working the other day, at that time I sent him a my chart message about him needing to get his lab work done ASAP.  He responded today and told me he came here and was told by lab and Urology that there were no labs ordered and that he did not need any.  I verified with lab that they see the orders for labs that need to be done and I messaged him back today explaining that the labs are there and that if he did not get them done today or first thing tomorrow morning, that we would not be able to see him for his appointment on Monday 6/24.  I apologized for any confusion and also advised that when he comes to get them done, if there are any problems to have them call us while he is still here.

## 2024-06-21 LAB
ALBUMIN SERPL-MCNC: 4.3 G/DL (ref 3.5–5.2)
ALBUMIN/GLOB SERPL: 2.2 G/DL
ALP SERPL-CCNC: 99 U/L (ref 39–117)
ALT SERPL-CCNC: 21 U/L (ref 1–41)
AST SERPL-CCNC: 16 U/L (ref 1–40)
BASOPHILS # BLD AUTO: 0.05 10*3/MM3 (ref 0–0.2)
BASOPHILS NFR BLD AUTO: 0.5 % (ref 0–1.5)
BILIRUB SERPL-MCNC: 0.5 MG/DL (ref 0–1.2)
BUN SERPL-MCNC: 17 MG/DL (ref 8–23)
BUN/CREAT SERPL: 17.3 (ref 7–25)
CALCIUM SERPL-MCNC: 9.2 MG/DL (ref 8.6–10.5)
CHLORIDE SERPL-SCNC: 107 MMOL/L (ref 98–107)
CHOLEST SERPL-MCNC: 115 MG/DL (ref 0–200)
CO2 SERPL-SCNC: 25.8 MMOL/L (ref 22–29)
CREAT SERPL-MCNC: 0.98 MG/DL (ref 0.76–1.27)
EGFRCR SERPLBLD CKD-EPI 2021: 83.5 ML/MIN/1.73
EOSINOPHIL # BLD AUTO: 0.11 10*3/MM3 (ref 0–0.4)
EOSINOPHIL NFR BLD AUTO: 1.1 % (ref 0.3–6.2)
ERYTHROCYTE [DISTWIDTH] IN BLOOD BY AUTOMATED COUNT: 12.6 % (ref 12.3–15.4)
GLOBULIN SER CALC-MCNC: 2 GM/DL
GLUCOSE SERPL-MCNC: 89 MG/DL (ref 65–99)
HCT VFR BLD AUTO: 44.2 % (ref 37.5–51)
HDLC SERPL-MCNC: 47 MG/DL (ref 40–60)
HGB BLD-MCNC: 14.5 G/DL (ref 13–17.7)
IMM GRANULOCYTES # BLD AUTO: 0.03 10*3/MM3 (ref 0–0.05)
IMM GRANULOCYTES NFR BLD AUTO: 0.3 % (ref 0–0.5)
LDLC SERPL CALC-MCNC: 56 MG/DL (ref 0–100)
LYMPHOCYTES # BLD AUTO: 1.57 10*3/MM3 (ref 0.7–3.1)
LYMPHOCYTES NFR BLD AUTO: 15.9 % (ref 19.6–45.3)
MCH RBC QN AUTO: 28.6 PG (ref 26.6–33)
MCHC RBC AUTO-ENTMCNC: 32.8 G/DL (ref 31.5–35.7)
MCV RBC AUTO: 87.2 FL (ref 79–97)
MONOCYTES # BLD AUTO: 0.63 10*3/MM3 (ref 0.1–0.9)
MONOCYTES NFR BLD AUTO: 6.4 % (ref 5–12)
NEUTROPHILS # BLD AUTO: 7.47 10*3/MM3 (ref 1.7–7)
NEUTROPHILS NFR BLD AUTO: 75.8 % (ref 42.7–76)
NRBC BLD AUTO-RTO: 0 /100 WBC (ref 0–0.2)
PLATELET # BLD AUTO: 258 10*3/MM3 (ref 140–450)
POTASSIUM SERPL-SCNC: 4.5 MMOL/L (ref 3.5–5.2)
PROT SERPL-MCNC: 6.3 G/DL (ref 6–8.5)
PSA SERPL-MCNC: 3.94 NG/ML (ref 0–4)
RBC # BLD AUTO: 5.07 10*6/MM3 (ref 4.14–5.8)
SODIUM SERPL-SCNC: 142 MMOL/L (ref 136–145)
TRIGL SERPL-MCNC: 48 MG/DL (ref 0–150)
TSH SERPL DL<=0.005 MIU/L-ACNC: 2.24 UIU/ML (ref 0.27–4.2)
VIT B12 SERPL-MCNC: 891 PG/ML (ref 211–946)
VLDLC SERPL CALC-MCNC: 12 MG/DL (ref 5–40)
WBC # BLD AUTO: 9.86 10*3/MM3 (ref 3.4–10.8)

## 2024-06-24 ENCOUNTER — OFFICE VISIT (OUTPATIENT)
Dept: UROLOGY | Facility: CLINIC | Age: 69
End: 2024-06-24
Payer: MEDICARE

## 2024-06-24 VITALS
OXYGEN SATURATION: 95 % | SYSTOLIC BLOOD PRESSURE: 126 MMHG | DIASTOLIC BLOOD PRESSURE: 62 MMHG | HEART RATE: 50 BPM | TEMPERATURE: 97.8 F | BODY MASS INDEX: 27.3 KG/M2 | WEIGHT: 206 LBS | HEIGHT: 73 IN

## 2024-06-24 DIAGNOSIS — R97.20 ELEVATED PROSTATE SPECIFIC ANTIGEN (PSA): Primary | ICD-10-CM

## 2024-06-24 DIAGNOSIS — R39.9 LOWER URINARY TRACT SYMPTOMS (LUTS): ICD-10-CM

## 2024-06-24 PROCEDURE — 3074F SYST BP LT 130 MM HG: CPT | Performed by: NURSE PRACTITIONER

## 2024-06-24 PROCEDURE — 1159F MED LIST DOCD IN RCRD: CPT | Performed by: NURSE PRACTITIONER

## 2024-06-24 PROCEDURE — 1160F RVW MEDS BY RX/DR IN RCRD: CPT | Performed by: NURSE PRACTITIONER

## 2024-06-24 PROCEDURE — 99213 OFFICE O/P EST LOW 20 MIN: CPT | Performed by: NURSE PRACTITIONER

## 2024-06-24 PROCEDURE — 3078F DIAST BP <80 MM HG: CPT | Performed by: NURSE PRACTITIONER

## 2024-06-24 NOTE — PROGRESS NOTES
Office Visit Established Male Patient     Patient Name: Seng Seymour  : 1955   MRN: [id number]     Chief Complaint:   Chief Complaint   Patient presents with    Follow-up     1 year for elevated PSA       History of Present Illness: Mr. Seng Seymour is a 69 y.o. male who presents today for follow up for BPH s/p TURP 01/10/23 and elevated PSA.  Mostly satisfied with LUTS.  He does have nocturia x2 and urgency.  He does not limit fluid intake at HS.  Mostly satisfied.        IPSS Questionnaire (AUA-7):  Incomplete emptying  Over the past month, how often have you had a sensation of not emptying your bladder completely after you finished urinating?: Less than 1 time in 5 (24)  Frequency  Over the past month, how often have you had to urinate again less than two hours after you finishied urinating ?: Less than 1 time in 5 (24)  Intermittency  Over the past month, how often have you found you stopped and started again several time when you urinated ?: Less than 1 time in 5 (24)  Urgency  Over the last month, how often have you found it difficult  have you found it difficult to postpone urination ?: Less than half the time (24)  Weak Stream  Over the past month, how often have you had a weak urinary stream ?: Not at all (24)  Straining  Over the past month, how often have you had to push or strain to begin urination ?: Not at all (24)  Nocturia  Over the past month, how many times did you most typically get up to urinate from the time you went to bed until the time you got up in the morning ?: 2 times (24)  Quality of life due to urinary symptoms  If you were to spend the rest of your life with your urinary condition the way it is now, how would feel about that?: Mostly satisfied (24)    Scores  Total IPSS Score: 7 (24)  Total Score = Symptomatic Level: Mildly symptomatic: 0-7 (24)         Subjective      Review of System:   As noted in HPI.    Past Medical History:   Past Medical History:   Diagnosis Date    Ankylosing spondylitis     Benign prostatic hyperplasia 2022    Hyperlipidemia     Hypertension        Past Surgical History:   Past Surgical History:   Procedure Laterality Date    COLONOSCOPY      CYSTOSCOPY TRANSURETHRAL RESECTION OF PROSTATE  01/10/2023    HERNIA REPAIR      NASAL SEPTAL RECONSTRUCTION         Family History:   Family History   Problem Relation Age of Onset    Cancer Mother     Lung cancer Mother     Parkinsonism Father     Hypertension Other        Social History:   Social History     Socioeconomic History    Marital status: Single   Tobacco Use    Smoking status: Never     Passive exposure: Never    Smokeless tobacco: Never   Vaping Use    Vaping status: Never Used   Substance and Sexual Activity    Alcohol use: No    Drug use: No    Sexual activity: Not Currently     Partners: Female       Medications:     Current Outpatient Medications:     amLODIPine (NORVASC) 10 MG tablet, TAKE 1 TABLET BY MOUTH EVERY DAY, Disp: 90 tablet, Rfl: 3    ASPIRIN LOW DOSE 81 MG EC tablet, TAKE 1 TABLET BY MOUTH ONCE DAILY, Disp: 90 tablet, Rfl: 3    atorvastatin (LIPITOR) 10 MG tablet, TAKE 1 TABLET BY MOUTH EVERY DAY, Disp: 90 tablet, Rfl: 3    fluticasone (FLONASE) 50 MCG/ACT nasal spray, 1 spray into the nostril(s) as directed by provider As Needed., Disp: , Rfl:     ketoconazole (NIZORAL) 2 % cream, APPLY TOPICALLY TO THE AFFECTED AREA OF FACE TWICE DAILY UNTIL GONE, Disp: , Rfl:     lisinopril (PRINIVIL,ZESTRIL) 10 MG tablet, TAKE 1 TABLET BY MOUTH EVERY DAY, Disp: 90 tablet, Rfl: 3    multivitamin (THERAGRAN) tablet tablet, Take  by mouth Daily., Disp: , Rfl:     naproxen (NAPROSYN) 500 MG tablet, Take 1 tablet by mouth 2 (Two) Times a Day With Meals., Disp: 60 tablet, Rfl: 5    traZODone (DESYREL) 50 MG tablet, Take 1 tablet by mouth Every Night., Disp: 30 tablet, Rfl: 4    vitamin  "C (ASCORBIC ACID) 250 MG tablet, Take 1 tablet by mouth Daily., Disp: , Rfl:     Allergies:   No Known Allergies    Objective     Physical Exam:   Vital Signs:   Vitals:    06/24/24 0958   BP: 126/62   Pulse: 50   Temp: 97.8 °F (36.6 °C)   SpO2: 95%   Weight: 93.4 kg (206 lb)   Height: 185.4 cm (72.99\")     Body mass index is 27.18 kg/m².   Physical Exam  Vitals and nursing note reviewed.   Constitutional:       General: He is awake. He is not in acute distress.     Appearance: He is not ill-appearing.   Pulmonary:      Effort: Pulmonary effort is normal.   Skin:     General: Skin is warm and dry.   Neurological:      Mental Status: He is alert and oriented to person, place, and time. Mental status is at baseline.   Psychiatric:         Mood and Affect: Mood normal.         Behavior: Behavior is cooperative.              Labs  Brief Urine Lab Results       None            Lab Results   Component Value Date    GLUCOSE 89 06/20/2024    CALCIUM 9.2 06/20/2024     06/20/2024    K 4.5 06/20/2024    CO2 25.8 06/20/2024     06/20/2024    BUN 17 06/20/2024    CREATININE 0.98 06/20/2024    EGFRIFAFRI 91 03/29/2021    EGFRIFNONA 75 03/29/2021    BCR 17.3 06/20/2024    ANIONGAP 17.0 (H) 11/30/2016       Lab Results   Component Value Date    WBC 9.86 06/20/2024    HGB 14.5 06/20/2024    HCT 44.2 06/20/2024    MCV 87.2 06/20/2024     06/20/2024            Lab Results   Component Value Date    PSA 3.940 06/20/2024       Office Visit on 06/06/2024   Component Date Value Ref Range Status    WBC 06/20/2024 9.86  3.40 - 10.80 10*3/mm3 Final    RBC 06/20/2024 5.07  4.14 - 5.80 10*6/mm3 Final    Hemoglobin 06/20/2024 14.5  13.0 - 17.7 g/dL Final    Hematocrit 06/20/2024 44.2  37.5 - 51.0 % Final    MCV 06/20/2024 87.2  79.0 - 97.0 fL Final    MCH 06/20/2024 28.6  26.6 - 33.0 pg Final    MCHC 06/20/2024 32.8  31.5 - 35.7 g/dL Final    RDW 06/20/2024 12.6  12.3 - 15.4 % Final    Platelets 06/20/2024 258  140 - 450 " 10*3/mm3 Final    Neutrophil Rel % 06/20/2024 75.8  42.7 - 76.0 % Final    Lymphocyte Rel % 06/20/2024 15.9 (L)  19.6 - 45.3 % Final    Monocyte Rel % 06/20/2024 6.4  5.0 - 12.0 % Final    Eosinophil Rel % 06/20/2024 1.1  0.3 - 6.2 % Final    Basophil Rel % 06/20/2024 0.5  0.0 - 1.5 % Final    Neutrophils Absolute 06/20/2024 7.47 (H)  1.70 - 7.00 10*3/mm3 Final    Lymphocytes Absolute 06/20/2024 1.57  0.70 - 3.10 10*3/mm3 Final    Monocytes Absolute 06/20/2024 0.63  0.10 - 0.90 10*3/mm3 Final    Eosinophils Absolute 06/20/2024 0.11  0.00 - 0.40 10*3/mm3 Final    Basophils Absolute 06/20/2024 0.05  0.00 - 0.20 10*3/mm3 Final    Immature Granulocyte Rel % 06/20/2024 0.3  0.0 - 0.5 % Final    Immature Grans Absolute 06/20/2024 0.03  0.00 - 0.05 10*3/mm3 Final    nRBC 06/20/2024 0.0  0.0 - 0.2 /100 WBC Final    Total Cholesterol 06/20/2024 115  0 - 200 mg/dL Final    Comment: Cholesterol Reference Ranges  (U.S. Department of Health and Human Services ATP III  Classifications)  Desirable          <200 mg/dL  Borderline High    200-239 mg/dL  High Risk          >240 mg/dL  Triglyceride Reference Ranges  (U.S. Department of Health and Human Services ATP III  Classifications)  Normal           <150 mg/dL  Borderline High  150-199 mg/dL  High             200-499 mg/dL  Very High        >500 mg/dL  HDL Reference Ranges  (U.S. Department of Health and Human Services ATP III  Classifications)  Low     <40 mg/dl (major risk factor for CHD)  High    >60 mg/dl ('negative' risk factor for CHD)  LDL Reference Ranges  (U.S. Department of Health and Human Services ATP III  Classifications)  Optimal          <100 mg/dL  Near Optimal     100-129 mg/dL  Borderline High  130-159 mg/dL  High             160-189 mg/dL  Very High        >189 mg/dL      Triglycerides 06/20/2024 48  0 - 150 mg/dL Final    HDL Cholesterol 06/20/2024 47  40 - 60 mg/dL Final    VLDL Cholesterol Osman 06/20/2024 12  5 - 40 mg/dL Final    LDL Chol Calc ([facility])  06/20/2024 56  0 - 100 mg/dL Final    PSA 06/20/2024 3.940  0.000 - 4.000 ng/mL Final    Comment: Testing Method: Roche [employer] Electrochemiluminescence  Immunoassay(ECLIA)  Values obtained with different assay methods or kits cannot  be used interchangeably.      Glucose 06/20/2024 89  65 - 99 mg/dL Final    BUN 06/20/2024 17  8 - 23 mg/dL Final    Creatinine 06/20/2024 0.98  0.76 - 1.27 mg/dL Final    EGFR Result 06/20/2024 83.5  >60.0 mL/min/1.73 Final    Comment: GFR Normal >60  Chronic Kidney Disease <60  Kidney Failure <15      BUN/Creatinine Ratio 06/20/2024 17.3  7.0 - 25.0 Final    Sodium 06/20/2024 142  136 - 145 mmol/L Final    Potassium 06/20/2024 4.5  3.5 - 5.2 mmol/L Final    Chloride 06/20/2024 107  98 - 107 mmol/L Final    Total CO2 06/20/2024 25.8  22.0 - 29.0 mmol/L Final    Calcium 06/20/2024 9.2  8.6 - 10.5 mg/dL Final    Total Protein 06/20/2024 6.3  6.0 - 8.5 g/dL Final    Albumin 06/20/2024 4.3  3.5 - 5.2 g/dL Final    Globulin 06/20/2024 2.0  gm/dL Final    A/G Ratio 06/20/2024 2.2  g/dL Final    Total Bilirubin 06/20/2024 0.5  0.0 - 1.2 mg/dL Final    Alkaline Phosphatase 06/20/2024 99  39 - 117 U/L Final    AST (SGOT) 06/20/2024 16  1 - 40 U/L Final    ALT (SGPT) 06/20/2024 21  1 - 41 U/L Final    Vitamin B-12 06/20/2024 891  211 - 946 pg/mL Final    Results may be falsely increased if patient taking Biotin.    TSH 06/20/2024 2.240  0.270 - 4.200 uIU/mL Final   Office Visit on 12/06/2023   Component Date Value Ref Range Status    RMSF IgM 12/06/2023 0.34  0.00 - 0.89 index Final    Comment:                                  Negative        <0.90                                   Equivocal 0.90 - 1.10                                   Positive        >1.10  **Effective January 8, 2024 [facility] Spotted Fever,**    IgM will be made non-orderable. [facility] offers    [id number] Spotted Fever Group Antibodies to aid in the    diagnosis of infections with [place] Spotted    Fever and other  closely related Rickettsia. This    will affect any existing profile. For further    information, please [facility]    [facility]ative.      RMSF IgG 12/06/2023 Negative  Negative Final    Comment: **Effective January 8, 2024 [place] Spotted Fev, IgG, Qn will**    be made non-orderable. [facility] offers [id number] Spotted Fever    Group Antibodies to aid in the diagnosis of infections with    [place] Spotted Fever and other closely related Rickettsia.    This will affect any existing profile. For further information,    please contact [facility].      Lyme Disease(B.burgdorferi)PCR 12/06/2023 Negative  Negative Final    Comment: No B. burgdorferi DNA Detected.  A negative PCR result for Borrelia burgdorferi on a blood sample does  not eliminate the possibility of Lyme disease. CDC recommends that  two-tiered serological testing in conjunction with clinical evaluation  be used as the primary method of diagnosis.  This test was developed and its performance characteristics determined  by [facility].  It has not been cleared or approved by the Food and Drug  Administration.  The FDA has determined that such clearance or  approval is not necessary.      E. chaffeensis ([facility]) IgG Titer 12/06/2023 Negative  Neg:<1:64 Final    E. chaffeensis ([facility]) IgM Titer 12/06/2023 Negative  Neg:<1:20 Final    HGE IgG Titer 12/06/2023 Negative  Neg:<1:64 Final    HGE IgM Titer 12/06/2023 Negative  Neg:<1:20 Final    Comment: Due to a reagent backorder, this test was performed using a different  assay. The reference interval for this alternate assay is:                                         Negative      <1:64                                         Positive       1:64 or greater      RESULT COMMENT: 12/06/2023 Comment   Final    Comment: Antibody titers may be negative in the first 7-10 days of illness.  A four-fold rise in IgG antibody titers for Anaplasma phagocytophilum  and/or Ehrlichia chaffeensis in  paired samples (acute and  convalescent) supports the diagnosis of anaplasmosis and/or  ehrlichiosis, respectively.      CCP Antibodies IgG/IgA 12/06/2023 4  0 - 19 units Final    Comment:                           Negative               <20                            Weak positive      20 - 39                            Moderate positive  40 - 59                            Strong positive        >59      RA Latex Turbid 12/06/2023 <10.0  <14.0 IU/mL Final    Sed Rate 12/06/2023 3  0 - 20 mm/hr Final    Uric Acid 12/06/2023 6.3  3.4 - 7.0 mg/dL Final    C-Reactive Protein 12/06/2023 <0.30  0.00 - 0.50 mg/dL Final    ASO 12/06/2023 25.7  0.0 - 200.0 IU/mL Final   Orders Only on 10/30/2023   Component Date Value Ref Range Status    PSA 11/16/2023 4.010 (H)  0.000 - 4.000 ng/mL Final    Comment: Testing Method: Roche [employer] Electrochemiluminescence  Immunoassay(ECLIA)  Values obtained with different assay methods or kits cannot  be used interchangeably.      Total Cholesterol 11/16/2023 132  0 - 200 mg/dL Final    Comment: Cholesterol Reference Ranges  (U.S. Department of Health and Human Services ATP III  Classifications)  Desirable          <200 mg/dL  Borderline High    200-239 mg/dL  High Risk          >240 mg/dL  Triglyceride Reference Ranges  (U.S. Department of Health and Human Services ATP III  Classifications)  Normal           <150 mg/dL  Borderline High  150-199 mg/dL  High             200-499 mg/dL  Very High        >500 mg/dL  HDL Reference Ranges  (U.S. Department of Health and Human Services ATP III  Classifications)  Low     <40 mg/dl (major risk factor for CHD)  High    >60 mg/dl ('negative' risk factor for CHD)  LDL Reference Ranges  (U.S. Department of Health and Human Services ATP III  Classifications)  Optimal          <100 mg/dL  Near Optimal     100-129 mg/dL  Borderline High  130-159 mg/dL  High             160-189 mg/dL  Very High        >189 mg/dL      Triglycerides 11/16/2023 87  0 - 150  mg/dL Final    HDL Cholesterol 11/16/2023 44  40 - 60 mg/dL Final    VLDL Cholesterol Osman 11/16/2023 17  5 - 40 mg/dL Final    LDL Chol Calc (NIH) 11/16/2023 71  0 - 100 mg/dL Final    WBC 11/16/2023 10.92 (H)  3.40 - 10.80 10*3/mm3 Final    RBC 11/16/2023 5.40  4.14 - 5.80 10*6/mm3 Final    Hemoglobin 11/16/2023 15.7  13.0 - 17.7 g/dL Final    Hematocrit 11/16/2023 45.8  37.5 - 51.0 % Final    MCV 11/16/2023 84.8  79.0 - 97.0 fL Final    MCH 11/16/2023 29.1  26.6 - 33.0 pg Final    MCHC 11/16/2023 34.3  31.5 - 35.7 g/dL Final    RDW 11/16/2023 12.2 (L)  12.3 - 15.4 % Final    Platelets 11/16/2023 282  140 - 450 10*3/mm3 Final    Neutrophil Rel % 11/16/2023 79.2 (H)  42.7 - 76.0 % Final    Lymphocyte Rel % 11/16/2023 12.7 (L)  19.6 - 45.3 % Final    Monocyte Rel % 11/16/2023 6.1  5.0 - 12.0 % Final    Eosinophil Rel % 11/16/2023 1.0  0.3 - 6.2 % Final    Basophil Rel % 11/16/2023 0.5  0.0 - 1.5 % Final    Neutrophils Absolute 11/16/2023 8.63 (H)  1.70 - 7.00 10*3/mm3 Final    Lymphocytes Absolute 11/16/2023 1.39  0.70 - 3.10 10*3/mm3 Final    Monocytes Absolute 11/16/2023 0.67  0.10 - 0.90 10*3/mm3 Final    Eosinophils Absolute 11/16/2023 0.11  0.00 - 0.40 10*3/mm3 Final    Basophils Absolute 11/16/2023 0.06  0.00 - 0.20 10*3/mm3 Final    Immature Granulocyte Rel % 11/16/2023 0.5  0.0 - 0.5 % Final    Immature Grans Absolute 11/16/2023 0.06 (H)  0.00 - 0.05 10*3/mm3 Final    nRBC 11/16/2023 0.1  0.0 - 0.2 /100 WBC Final    TSH 11/16/2023 2.800  0.270 - 4.200 uIU/mL Final    Vitamin B-12 11/16/2023 788  211 - 946 pg/mL Final    Results may be falsely increased if patient taking Biotin.    Glucose 11/16/2023 112 (H)  65 - 99 mg/dL Final    BUN 11/16/2023 15  8 - 23 mg/dL Final    Creatinine 11/16/2023 1.00  0.76 - 1.27 mg/dL Final    EGFR Result 11/16/2023 82.0  >60.0 mL/min/1.73 Final    Comment: GFR Normal >60  Chronic Kidney Disease <60  Kidney Failure <15      BUN/Creatinine Ratio 11/16/2023 15.0  7.0 - 25.0 Final     Sodium 11/16/2023 141  136 - 145 mmol/L Final    Potassium 11/16/2023 4.2  3.5 - 5.2 mmol/L Final    Chloride 11/16/2023 105  98 - 107 mmol/L Final    Total CO2 11/16/2023 26.0  22.0 - 29.0 mmol/L Final    Calcium 11/16/2023 9.4  8.6 - 10.5 mg/dL Final    Total Protein 11/16/2023 6.7  6.0 - 8.5 g/dL Final    Albumin 11/16/2023 4.6  3.5 - 5.2 g/dL Final    Globulin 11/16/2023 2.1  gm/dL Final    A/G Ratio 11/16/2023 2.2  g/dL Final    Total Bilirubin 11/16/2023 0.8  0.0 - 1.2 mg/dL Final    Alkaline Phosphatase 11/16/2023 107  39 - 117 U/L Final    AST (SGOT) 11/16/2023 22  1 - 40 U/L Final    ALT (SGPT) 11/16/2023 33  1 - 41 U/L Final    Free T4 11/16/2023 1.23  0.93 - 1.70 ng/dL Final    Results may be falsely increased if patient taking Biotin.    25 Hydroxy, Vitamin D 11/16/2023 30.9  30.0 - 100.0 ng/ml Final    Comment: Reference Range for Total Vitamin D 25(OH)  Deficiency <20.0 ng/mL  Insufficiency 21-29 ng/mL  Sufficiency  ng/mL  Toxicity >100 ng/ml      Hemoglobin A1C 11/16/2023 5.40  4.80 - 5.60 % Final    Comment: Hemoglobin A1C Ranges:  Increased Risk for Diabetes  5.7% to 6.4%  Diabetes                     >= 6.5%  Diabetic Goal                < 7.0%      Unable to Void 11/16/2023 Comment   Final    Patient unable to void. Urine to be collected at a later date.           I have reviewed the above labs.    Assessment / Plan      Assessment:   Diagnoses and all orders for this visit:    1. Elevated prostate specific antigen (PSA) (Primary)  -     PSA DIAGNOSTIC; Future    2. Lower urinary tract symptoms (LUTS)         69 y.o. male presents for follow up for BPH s/p TURP 01/10/23 and elevated PSA.  PSA obtained 06/20/24 was 3.940, was 4.010 in 2023.  Does not recall having a prostate biopsy and MRI in the past.  Prostate chips were negative for malignancy.  4K in 2022 was 3%.  IPSS 7 and is mostly satisfied.  He is not on any medications for bladder or prostate.  Most bothersome symptoms are  nocturia x2, urgency.  He does not limit intake at bedtime.  Advised to avoid fluids 3 hours prior to bedtime.  Patient previously on myrbetriq but cannot recall if medications was effective.  Does not wish to resume.     Plan:    PSA in 1 year  Follow up in 1 year with PSA prior, PVR, IPSS, and UA  Limit intake of fluids 3 hours prior to bedtime.     Follow Up:   Return in about 1 year (around 6/24/2025) for recheck with Elizabeth, labs prior, with IPSS, with PVR and UA.      RAYMOND Meredith  [facility]

## 2024-10-30 DIAGNOSIS — I10 ESSENTIAL HYPERTENSION: ICD-10-CM

## 2024-10-30 RX ORDER — AMLODIPINE BESYLATE 10 MG/1
TABLET ORAL
Qty: 90 TABLET | Refills: 3 | Status: SHIPPED | OUTPATIENT
Start: 2024-10-30

## 2024-11-18 NOTE — ADDENDUM NOTE
Addended by: LIZBETH HERNANDEZ on: 5/13/2019 09:17 AM     Modules accepted: Level of Service     13 y/o M to ED transfer from [facility] for L tibia fx s/p fall while roller skating ~ 1100 today.  Pt transferred with splint on. +distal motor and sensory intact.

## 2024-11-27 ENCOUNTER — OFFICE VISIT (OUTPATIENT)
Dept: INTERNAL MEDICINE | Facility: CLINIC | Age: 69
End: 2024-11-27
Payer: MEDICARE

## 2024-11-27 VITALS
DIASTOLIC BLOOD PRESSURE: 82 MMHG | OXYGEN SATURATION: 97 % | TEMPERATURE: 98.7 F | BODY MASS INDEX: 27.7 KG/M2 | HEART RATE: 62 BPM | RESPIRATION RATE: 16 BRPM | SYSTOLIC BLOOD PRESSURE: 138 MMHG | WEIGHT: 209 LBS | HEIGHT: 73 IN

## 2024-11-27 DIAGNOSIS — A05.9 FOOD POISONING DUE TO BACTERIA: Primary | ICD-10-CM

## 2024-11-27 RX ORDER — AZITHROMYCIN 500 MG/1
500 TABLET, FILM COATED ORAL DAILY
Qty: 3 TABLET | Refills: 0 | Status: SHIPPED | OUTPATIENT
Start: 2024-11-27

## 2025-02-06 ENCOUNTER — OFFICE VISIT (OUTPATIENT)
Dept: INTERNAL MEDICINE | Facility: CLINIC | Age: 70
End: 2025-02-06
Payer: MEDICARE

## 2025-02-06 VITALS
HEART RATE: 62 BPM | DIASTOLIC BLOOD PRESSURE: 70 MMHG | TEMPERATURE: 96.4 F | HEIGHT: 73 IN | OXYGEN SATURATION: 96 % | RESPIRATION RATE: 16 BRPM | WEIGHT: 212 LBS | BODY MASS INDEX: 28.1 KG/M2 | SYSTOLIC BLOOD PRESSURE: 110 MMHG

## 2025-02-06 DIAGNOSIS — E55.9 VITAMIN D DEFICIENCY, UNSPECIFIED: ICD-10-CM

## 2025-02-06 DIAGNOSIS — Z00.00 ROUTINE GENERAL MEDICAL EXAMINATION AT A HEALTH CARE FACILITY: Primary | ICD-10-CM

## 2025-02-06 DIAGNOSIS — Z12.11 COLON CANCER SCREENING: ICD-10-CM

## 2025-02-06 PROCEDURE — 99397 PER PM REEVAL EST PAT 65+ YR: CPT | Performed by: INTERNAL MEDICINE

## 2025-02-06 PROCEDURE — 3074F SYST BP LT 130 MM HG: CPT | Performed by: INTERNAL MEDICINE

## 2025-02-06 PROCEDURE — 1126F AMNT PAIN NOTED NONE PRSNT: CPT | Performed by: INTERNAL MEDICINE

## 2025-02-06 PROCEDURE — G0439 PPPS, SUBSEQ VISIT: HCPCS | Performed by: INTERNAL MEDICINE

## 2025-02-06 PROCEDURE — 3078F DIAST BP <80 MM HG: CPT | Performed by: INTERNAL MEDICINE

## 2025-02-06 PROCEDURE — 1170F FXNL STATUS ASSESSED: CPT | Performed by: INTERNAL MEDICINE

## 2025-02-06 NOTE — PROGRESS NOTES
Subjective   The ABCs of the Annual Wellness Visit  Medicare Wellness Visit      Seng Seymour is a 69 y.o. patient who presents for a Medicare Wellness Visit.    The following portions of the patient's history were reviewed and   updated as appropriate: allergies, current medications, past family history, past medical history, past social history, past surgical history, and problem list.    Compared to one year ago, the patient's physical   health is the same.  Compared to one year ago, the patient's mental   health is the same.    Recent Hospitalizations:  He was not admitted to the hospital during the last year.     Current Medical Providers:  Patient Care Team:  Kei Jarrett MD as PCP - General (Internal Medicine)  Myke Rodríguez MD as Consulting Physician (Urology)  Robi Jain IV, MD as Consulting Physician (Interventional Cardiology)    Outpatient Medications Prior to Visit   Medication Sig Dispense Refill    amLODIPine (NORVASC) 10 MG tablet TAKE 1 TABLET BY MOUTH EVERY DAY 90 tablet 3    ASPIRIN LOW DOSE 81 MG EC tablet TAKE 1 TABLET BY MOUTH ONCE DAILY 90 tablet 3    atorvastatin (LIPITOR) 10 MG tablet TAKE 1 TABLET BY MOUTH EVERY DAY 90 tablet 3    azithromycin (Zithromax) 500 MG tablet Take 1 tablet by mouth Daily. 3 tablet 0    fluticasone (FLONASE) 50 MCG/ACT nasal spray Administer 1 spray into the nostril(s) as directed by provider As Needed.      ketoconazole (NIZORAL) 2 % cream APPLY TOPICALLY TO THE AFFECTED AREA OF FACE TWICE DAILY UNTIL GONE      lisinopril (PRINIVIL,ZESTRIL) 10 MG tablet TAKE 1 TABLET BY MOUTH EVERY DAY 90 tablet 3    multivitamin (THERAGRAN) tablet tablet Take  by mouth Daily.      naproxen (NAPROSYN) 500 MG tablet Take 1 tablet by mouth 2 (Two) Times a Day With Meals. 60 tablet 5    traZODone (DESYREL) 50 MG tablet Take 1 tablet by mouth Every Night. 30 tablet 4    vitamin C (ASCORBIC ACID) 250 MG tablet Take 1 tablet by mouth Daily.       No  "facility-administered medications prior to visit.     No opioid medication identified on active medication list. I have reviewed chart for other potential  high risk medication/s and harmful drug interactions in the elderly.      Aspirin is on active medication list. Aspirin use is indicated based on review of current medical condition/s. Pros and cons of this therapy have been discussed today. Benefits of this medication outweigh potential harm.  Patient has been encouraged to continue taking this medication.  .      Patient Active Problem List   Diagnosis    Ankylosing spondylitis    Benign prostatic hyperplasia with urinary obstruction    Erectile dysfunction of nonorganic origin    Edema    Hypercholesterolemia    Essential hypertension    Skin lesion    Neurocardiogenic syncope    Cobalamin deficiency    Diverticulosis of large intestine    Internal hemorrhage    Elevated prostate specific antigen (PSA)    Lower urinary tract symptoms (LUTS)     Advance Care Planning Advance Directive is not on file.  ACP discussion was held with the patient during this visit. Patient has an advance directive (not in EMR), copy requested.            Objective   Vitals:    02/06/25 1321   BP: 110/70   Pulse: 62   Resp: 16   Temp: 96.4 °F (35.8 °C)   SpO2: 96%   Weight: 96.2 kg (212 lb)   Height: 185.4 cm (72.99\")   PainSc: 0-No pain       Estimated body mass index is 27.98 kg/m² as calculated from the following:    Height as of this encounter: 185.4 cm (72.99\").    Weight as of this encounter: 96.2 kg (212 lb).    BMI is >= 25 and <30. (Overweight) The following options were offered after discussion;: weight loss educational material (shared in after visit summary), exercise counseling/recommendations, and nutrition counseling/recommendations    Gait and Balance Evaluation:  Normal   Does the patient have evidence of cognitive impairment? No                                                                                          "       Health  Risk Assessment    Smoking Status:  Social History     Tobacco Use   Smoking Status Never    Passive exposure: Never   Smokeless Tobacco Never     Alcohol Consumption:  Social History     Substance and Sexual Activity   Alcohol Use No       Fall Risk Screen  INDIRAADI Fall Risk Assessment was completed, and patient is at LOW risk for falls.Assessment completed on:2025    Depression Screening   Little interest or pleasure in doing things? Not at all   Feeling down, depressed, or hopeless? Not at all   PHQ-2 Total Score 0      Health Habits and Functional and Cognitive Screenin/6/2025     1:23 PM   Functional & Cognitive Status   Do you have difficulty preparing food and eating? No   Do you have difficulty bathing yourself, getting dressed or grooming yourself? No   Do you have difficulty using the toilet? No   Do you have difficulty moving around from place to place? No   Do you have trouble with steps or getting out of a bed or a chair? No   Current Diet Well Balanced Diet   Dental Exam Up to date   Eye Exam Up to date   Exercise (times per week) 5 times per week   Current Exercises Include Walking        Exercise Comment Gym and weight lifting   Do you need help using the phone?  No   Are you deaf or do you have serious difficulty hearing?  Yes   Do you need help to go to places out of walking distance? No   Do you need help shopping? No   Do you need help preparing meals?  No   Do you need help with housework?  No   Do you need help with laundry? No   Do you need help taking your medications? No   Do you need help managing money? No   Do you ever drive or ride in a car without wearing a seat belt? No   Have you felt unusual stress, anger or loneliness in the last month? No   Who do you live with? Alone   If you need help, do you have trouble finding someone available to you? No   Have you been bothered in the last four weeks by sexual problems? No   Do you have difficulty concentrating,  "remembering or making decisions? No           Age-appropriate Screening Schedule:  Refer to the list below for future screening recommendations based on patient's age, sex and/or medical conditions. Orders for these recommended tests are listed in the plan section. The patient has been provided with a written plan.    Health Maintenance List  Health Maintenance   Topic Date Due    ANNUAL WELLNESS VISIT  12/06/2024    BMI FOLLOWUP  12/06/2024    LIPID PANEL  06/20/2025    COLORECTAL CANCER SCREENING  01/04/2028    TDAP/TD VACCINES (4 - Td or Tdap) 06/21/2034    COVID-19 Vaccine  Completed    INFLUENZA VACCINE  Completed    Pneumococcal Vaccine 65+  Completed    ZOSTER VACCINE  Completed    HEPATITIS C SCREENING  Addressed                                                                                                                                                CMS Preventative Services Quick Reference  Risk Factors Identified During Encounter  Chronic Pain:  no chronic pain. Hit the wrong button.     The above risks/problems have been discussed with the patient.  Pertinent information has been shared with the patient in the After Visit Summary.  An After Visit Summary and PPPS were made available to the patient.    Follow Up:   Next Medicare Wellness visit to be scheduled in 1 year.         Additional E&M Note during same encounter follows:  Patient has additional, significant, and separately identifiable condition(s)/problem(s) that require work above and beyond the Medicare Wellness Visit     Chief Complaint  Medicare Wellness-subsequent    Subjective   HPI  BILL is also being seen today for an annual adult preventative physical exam.                 Objective   Vital Signs:  /70   Pulse 62   Temp 96.4 °F (35.8 °C)   Resp 16   Ht 185.4 cm (72.99\")   Wt 96.2 kg (212 lb)   SpO2 96%   BMI 27.98 kg/m²   Physical Exam    The following data was reviewed by: Kei Jarrett MD on 02/06/2025:  Data " reviewed : GI studies colonoscopy.   CMP          6/20/2024    15:03   CMP   Glucose 89    BUN 17    Creatinine 0.98    Sodium 142    Potassium 4.5    Chloride 107    Calcium 9.2    Total Protein 6.3    Albumin 4.3    Globulin 2.0    Total Bilirubin 0.5    Alkaline Phosphatase 99    AST (SGOT) 16    ALT (SGPT) 21    BUN/Creatinine Ratio 17.3      CBC          6/20/2024    15:03   CBC   WBC 9.86    RBC 5.07    Hemoglobin 14.5    Hematocrit 44.2    MCV 87.2    MCH 28.6    MCHC 32.8    RDW 12.6    Platelets 258              Assessment and Plan Additional age appropriate preventative wellness advice topics were discussed during today's preventative wellness exam(some topics already addressed during AWV portion of the note above):    Physical Activity: Advised cardiovascular activity 150 minutes per week as tolerated. (example brisk walk for 30 minutes, 5 days a week).     Nutrition: Discussed nutrition plan with patient. Information shared in after visit summary. Goal is for a well balanced diet to enhance overall health.     Healthy Weight: Discussed current and goal BMI with patient. Steps to attain this goal discussed. Information shared in after visit summary.           Routine general medical examination at a health care facility    Orders:    Comprehensive Metabolic Panel    Vitamin B12    CBC & Differential    Lipid Panel    Vitamin D,25-Hydroxy    Hemoglobin A1c    Vitamin D deficiency, unspecified    Orders:    Vitamin D,25-Hydroxy    Colon cancer screening    Orders:    Ambulatory Referral to Gastroenterology          I spent 12 minutes caring for Seng on this date of service. This time includes time spent by me in the following activities:preparing for the visit, reviewing tests, and obtaining and/or reviewing a separately obtained history  Follow Up   No follow-ups on file.  Patient was given instructions and counseling regarding his condition or for health maintenance advice. Please see specific  information pulled into the AVS if appropriate.

## 2025-02-06 NOTE — PROGRESS NOTES
"Subjective     Patient ID: Seng Seymour is a 69 y.o. male. Patient is here for management of multiple medical problems.     Chief Complaint   Patient presents with    Medicare Wellness-subsequent     History of Present Illness   M/c      The following portions of the patient's history were reviewed and updated as appropriate: allergies, current medications, past family history, past medical history, past social history, past surgical history and problem list.    Review of Systems    Current Outpatient Medications:     amLODIPine (NORVASC) 10 MG tablet, TAKE 1 TABLET BY MOUTH EVERY DAY, Disp: 90 tablet, Rfl: 3    ASPIRIN LOW DOSE 81 MG EC tablet, TAKE 1 TABLET BY MOUTH ONCE DAILY, Disp: 90 tablet, Rfl: 3    atorvastatin (LIPITOR) 10 MG tablet, TAKE 1 TABLET BY MOUTH EVERY DAY, Disp: 90 tablet, Rfl: 3    azithromycin (Zithromax) 500 MG tablet, Take 1 tablet by mouth Daily., Disp: 3 tablet, Rfl: 0    fluticasone (FLONASE) 50 MCG/ACT nasal spray, Administer 1 spray into the nostril(s) as directed by provider As Needed., Disp: , Rfl:     ketoconazole (NIZORAL) 2 % cream, APPLY TOPICALLY TO THE AFFECTED AREA OF FACE TWICE DAILY UNTIL GONE, Disp: , Rfl:     lisinopril (PRINIVIL,ZESTRIL) 10 MG tablet, TAKE 1 TABLET BY MOUTH EVERY DAY, Disp: 90 tablet, Rfl: 3    multivitamin (THERAGRAN) tablet tablet, Take  by mouth Daily., Disp: , Rfl:     naproxen (NAPROSYN) 500 MG tablet, Take 1 tablet by mouth 2 (Two) Times a Day With Meals., Disp: 60 tablet, Rfl: 5    traZODone (DESYREL) 50 MG tablet, Take 1 tablet by mouth Every Night., Disp: 30 tablet, Rfl: 4    vitamin C (ASCORBIC ACID) 250 MG tablet, Take 1 tablet by mouth Daily., Disp: , Rfl:     Objective      Blood pressure 110/70, pulse 62, temperature 96.4 °F (35.8 °C), resp. rate 16, height 185.4 cm (72.99\"), weight 96.2 kg (212 lb), SpO2 96%.    BMI is >= 25 and <30. (Overweight) The following options were offered after discussion;: weight loss educational material " (shared in after visit summary), exercise counseling/recommendations, and nutrition counseling/recommendations       Physical Exam     General Appearance:    Alert, cooperative, no distress, appears stated age   Head:    Normocephalic, without obvious abnormality, atraumatic   Eyes:    PERRL, conjunctiva/corneas clear, EOM's intact   Ears:    Normal TM's and external ear canals, both ears   Nose:   Nares normal, septum midline, mucosa normal, no drainage   or sinus tenderness   Throat:   Lips, mucosa, and tongue normal; teeth and gums normal   Neck:   Supple, symmetrical, trachea midline, no adenopathy;        thyroid:  No enlargement/tenderness/nodules; no carotid    bruit or JVD   Back:     Symmetric, no curvature, ROM normal, no CVA tenderness   Lungs:     Clear to auscultation bilaterally, respirations unlabored   Chest wall:    No tenderness or deformity   Heart:    Regular rate and rhythm, S1 and S2 normal, no murmur,        rub or gallop   Abdomen:     Soft, non-tender, bowel sounds active all four quadrants,     no masses, no organomegaly   Extremities:   Extremities normal, atraumatic, no cyanosis or edema   Pulses:   2+ and symmetric all extremities   Skin:   Skin color, texture, turgor normal, no rashes or lesions   Lymph nodes:   Cervical, supraclavicular, and axillary nodes normal   Neurologic:   CNII-XII intact. Normal strength, sensation and reflexes       throughout      Results for orders placed or performed in visit on 06/06/24   Lipid Panel    Collection Time: 06/20/24  3:03 PM    Specimen: Blood   Result Value Ref Range    Total Cholesterol 115 0 - 200 mg/dL    Triglycerides 48 0 - 150 mg/dL    HDL Cholesterol 47 40 - 60 mg/dL    VLDL Cholesterol Osman 12 5 - 40 mg/dL    LDL Chol Calc (NIH) 56 0 - 100 mg/dL   PSA Screen    Collection Time: 06/20/24  3:03 PM    Specimen: Blood   Result Value Ref Range    PSA 3.940 0.000 - 4.000 ng/mL   Comprehensive Metabolic Panel    Collection Time: 06/20/24  3:03  PM    Specimen: Blood   Result Value Ref Range    Glucose 89 65 - 99 mg/dL    BUN 17 8 - 23 mg/dL    Creatinine 0.98 0.76 - 1.27 mg/dL    EGFR Result 83.5 >60.0 mL/min/1.73    BUN/Creatinine Ratio 17.3 7.0 - 25.0    Sodium 142 136 - 145 mmol/L    Potassium 4.5 3.5 - 5.2 mmol/L    Chloride 107 98 - 107 mmol/L    Total CO2 25.8 22.0 - 29.0 mmol/L    Calcium 9.2 8.6 - 10.5 mg/dL    Total Protein 6.3 6.0 - 8.5 g/dL    Albumin 4.3 3.5 - 5.2 g/dL    Globulin 2.0 gm/dL    A/G Ratio 2.2 g/dL    Total Bilirubin 0.5 0.0 - 1.2 mg/dL    Alkaline Phosphatase 99 39 - 117 U/L    AST (SGOT) 16 1 - 40 U/L    ALT (SGPT) 21 1 - 41 U/L   Vitamin B12    Collection Time: 06/20/24  3:03 PM    Specimen: Blood   Result Value Ref Range    Vitamin B-12 891 211 - 946 pg/mL   TSH    Collection Time: 06/20/24  3:03 PM    Specimen: Blood   Result Value Ref Range    TSH 2.240 0.270 - 4.200 uIU/mL   CBC & Differential    Collection Time: 06/20/24  3:03 PM    Specimen: Blood   Result Value Ref Range    WBC 9.86 3.40 - 10.80 10*3/mm3    RBC 5.07 4.14 - 5.80 10*6/mm3    Hemoglobin 14.5 13.0 - 17.7 g/dL    Hematocrit 44.2 37.5 - 51.0 %    MCV 87.2 79.0 - 97.0 fL    MCH 28.6 26.6 - 33.0 pg    MCHC 32.8 31.5 - 35.7 g/dL    RDW 12.6 12.3 - 15.4 %    Platelets 258 140 - 450 10*3/mm3    Neutrophil Rel % 75.8 42.7 - 76.0 %    Lymphocyte Rel % 15.9 (L) 19.6 - 45.3 %    Monocyte Rel % 6.4 5.0 - 12.0 %    Eosinophil Rel % 1.1 0.3 - 6.2 %    Basophil Rel % 0.5 0.0 - 1.5 %    Neutrophils Absolute 7.47 (H) 1.70 - 7.00 10*3/mm3    Lymphocytes Absolute 1.57 0.70 - 3.10 10*3/mm3    Monocytes Absolute 0.63 0.10 - 0.90 10*3/mm3    Eosinophils Absolute 0.11 0.00 - 0.40 10*3/mm3    Basophils Absolute 0.05 0.00 - 0.20 10*3/mm3    Immature Granulocyte Rel % 0.3 0.0 - 0.5 %    Immature Grans Absolute 0.03 0.00 - 0.05 10*3/mm3    nRBC 0.0 0.0 - 0.2 /100 WBC         Assessment & Plan   Last colonoscopy 2018.  Recommended to do in 7 years.        Diagnoses and all orders for  this visit:    1. Routine general medical examination at a health care facility (Primary)  -     Comprehensive Metabolic Panel  -     Vitamin B12  -     CBC & Differential  -     Lipid Panel  -     Vitamin D,25-Hydroxy  -     Hemoglobin A1c    2. Vitamin D deficiency, unspecified  -     Vitamin D,25-Hydroxy    3. Colon cancer screening  -     Ambulatory Referral to Gastroenterology      No follow-ups on file.          There are no Patient Instructions on file for this visit.     Kei Jarrett MD    Assessment & Plan

## 2025-02-20 RX ORDER — NAPROXEN 500 MG/1
500 TABLET ORAL 2 TIMES DAILY WITH MEALS
Qty: 60 TABLET | Refills: 5 | Status: SHIPPED | OUTPATIENT
Start: 2025-02-20

## 2025-04-29 DIAGNOSIS — I10 ESSENTIAL HYPERTENSION: ICD-10-CM

## 2025-04-29 RX ORDER — LISINOPRIL 10 MG/1
10 TABLET ORAL DAILY
Qty: 90 TABLET | Refills: 3 | Status: SHIPPED | OUTPATIENT
Start: 2025-04-29

## 2025-04-29 NOTE — TELEPHONE ENCOUNTER
"  Caller: Seng Seymour \"BILL\"    Relationship: Self    Best call back number: [phone]     Requested Prescriptions:   Requested Prescriptions     Pending Prescriptions Disp Refills    lisinopril (PRINIVIL,ZESTRIL) 10 MG tablet 90 tablet 3     Sig: Take 1 tablet by mouth Daily.        Pharm[id number] [place] [address] [address] AT [facility] [address] - [phone]  - [phone]     Last office visit with prescribing clinician: 2/6/2025   Last telemedicine visit with prescribing clinician: Visit date not found   Next office visit with prescribing clinician: 8/6/2025     Additional details provided by patient:     Does the patient have less than a 3 day supply:  [] Yes  [x] No    Would you like a call back once the refill request has been completed: [] Yes [x] No    If the office needs to give you a call back, can they leave a voicemail: [] Yes [x] No    Viral Goeva Rep   04/29/25 09:26 EDT           "

## 2025-07-02 ENCOUNTER — OFFICE VISIT (OUTPATIENT)
Dept: UROLOGY | Facility: CLINIC | Age: 70
End: 2025-07-02
Payer: MEDICARE

## 2025-07-02 VITALS
TEMPERATURE: 97.3 F | HEIGHT: 74 IN | BODY MASS INDEX: 27.98 KG/M2 | DIASTOLIC BLOOD PRESSURE: 82 MMHG | OXYGEN SATURATION: 96 % | HEART RATE: 63 BPM | RESPIRATION RATE: 14 BRPM | SYSTOLIC BLOOD PRESSURE: 148 MMHG | WEIGHT: 218 LBS

## 2025-07-02 DIAGNOSIS — N40.0 BPH WITHOUT URINARY OBSTRUCTION: Primary | ICD-10-CM

## 2025-07-02 DIAGNOSIS — N39.41 URGE INCONTINENCE OF URINE: ICD-10-CM

## 2025-07-02 DIAGNOSIS — R39.15 URINARY URGENCY: ICD-10-CM

## 2025-07-02 DIAGNOSIS — R97.20 ELEVATED PROSTATE SPECIFIC ANTIGEN (PSA): ICD-10-CM

## 2025-07-02 DIAGNOSIS — N32.81 OVERACTIVE BLADDER: ICD-10-CM

## 2025-07-02 LAB
25(OH)D3+25(OH)D2 SERPL-MCNC: 29.3 NG/ML (ref 30–100)
ALBUMIN SERPL-MCNC: 4.3 G/DL (ref 3.5–5.2)
ALBUMIN/GLOB SERPL: 1.8 G/DL
ALP SERPL-CCNC: 111 U/L (ref 39–117)
ALT SERPL-CCNC: 32 U/L (ref 1–41)
AST SERPL-CCNC: 23 U/L (ref 1–40)
BASOPHILS # BLD AUTO: 0.04 10*3/MM3 (ref 0–0.2)
BASOPHILS NFR BLD AUTO: 0.4 % (ref 0–1.5)
BILIRUB SERPL-MCNC: 0.8 MG/DL (ref 0–1.2)
BUN SERPL-MCNC: 16 MG/DL (ref 8–23)
BUN/CREAT SERPL: 14 (ref 7–25)
CALCIUM SERPL-MCNC: 8.8 MG/DL (ref 8.6–10.5)
CHLORIDE SERPL-SCNC: 104 MMOL/L (ref 98–107)
CHOLEST SERPL-MCNC: 129 MG/DL (ref 0–200)
CO2 SERPL-SCNC: 25 MMOL/L (ref 22–29)
CREAT SERPL-MCNC: 1.14 MG/DL (ref 0.76–1.27)
EGFRCR SERPLBLD CKD-EPI 2021: 69.2 ML/MIN/1.73
EOSINOPHIL # BLD AUTO: 0.16 10*3/MM3 (ref 0–0.4)
EOSINOPHIL NFR BLD AUTO: 1.7 % (ref 0.3–6.2)
ERYTHROCYTE [DISTWIDTH] IN BLOOD BY AUTOMATED COUNT: 12.4 % (ref 12.3–15.4)
GLOBULIN SER CALC-MCNC: 2.4 GM/DL
GLUCOSE SERPL-MCNC: 101 MG/DL (ref 65–99)
HBA1C MFR BLD: 5.4 % (ref 4.8–5.6)
HCT VFR BLD AUTO: 46.5 % (ref 37.5–51)
HDLC SERPL-MCNC: 48 MG/DL (ref 40–60)
HGB BLD-MCNC: 15.7 G/DL (ref 13–17.7)
IMM GRANULOCYTES # BLD AUTO: 0.03 10*3/MM3 (ref 0–0.05)
IMM GRANULOCYTES NFR BLD AUTO: 0.3 % (ref 0–0.5)
LDLC SERPL CALC-MCNC: 67 MG/DL (ref 0–100)
LYMPHOCYTES # BLD AUTO: 1.78 10*3/MM3 (ref 0.7–3.1)
LYMPHOCYTES NFR BLD AUTO: 18.4 % (ref 19.6–45.3)
MCH RBC QN AUTO: 29.3 PG (ref 26.6–33)
MCHC RBC AUTO-ENTMCNC: 33.8 G/DL (ref 31.5–35.7)
MCV RBC AUTO: 86.9 FL (ref 79–97)
MONOCYTES # BLD AUTO: 0.69 10*3/MM3 (ref 0.1–0.9)
MONOCYTES NFR BLD AUTO: 7.1 % (ref 5–12)
NEUTROPHILS # BLD AUTO: 6.96 10*3/MM3 (ref 1.7–7)
NEUTROPHILS NFR BLD AUTO: 72.1 % (ref 42.7–76)
NRBC BLD AUTO-RTO: 0 /100 WBC (ref 0–0.2)
PLATELET # BLD AUTO: 292 10*3/MM3 (ref 140–450)
POTASSIUM SERPL-SCNC: 4.5 MMOL/L (ref 3.5–5.2)
PROT SERPL-MCNC: 6.7 G/DL (ref 6–8.5)
RBC # BLD AUTO: 5.35 10*6/MM3 (ref 4.14–5.8)
SODIUM SERPL-SCNC: 140 MMOL/L (ref 136–145)
TRIGL SERPL-MCNC: 71 MG/DL (ref 0–150)
VIT B12 SERPL-MCNC: 712 PG/ML (ref 211–946)
VLDLC SERPL CALC-MCNC: 14 MG/DL (ref 5–40)
WBC # BLD AUTO: 9.66 10*3/MM3 (ref 3.4–10.8)

## 2025-07-02 RX ORDER — VIBEGRON 75 MG/1
75 TABLET, FILM COATED ORAL DAILY
Qty: 30 TABLET | Refills: 11 | Status: SHIPPED | OUTPATIENT
Start: 2025-07-02

## 2025-07-02 NOTE — PROGRESS NOTES
Office Visit     Patient: Seng Seymour 70 y.o. male   : 1955   MRN: [id number]     Patient or patient representative verbalized consent for the use of Ambient Listening during the visit with  RAYMOND Meredith for chart documentation. 2025  11:22 EDT    Chief Complaint   Patient presents with    Elevated PSA     Referring Provider: No ref. provider found    Primary Care Provider: Kei Jarrett MD     History of Present Illness  The patient is a 69-year-old male presenting for a 1-year follow-up for BPH post-TURP on 01/10/2023 and elevated PSA.    Urinary urgency and nocturia  - Reports getting up twice a night  - IPSS score increased from 7 to 11  - Does not take medication for urinary issues  - Does not monitor fluid intake before bedtime  - Occasionally has difficulty holding urine  - Avoids caffeine and drinks uncaffeinated soda  - No leakage during laughter, coughing, or sneezing  - Took Myrbetriq once in  without adverse effects  - Discussed risk of elevated BP with myrbetriq and black box warning  - Open to trying Gemtesa if covered by insurance  - Urination frequency varies; after dinner and significant fluid intake, urinates every 1.5 hours    Elevated PSA  - 4K score in  was 3%  - Last PSA screening in 2024 was 3.9, previously 4  - Has not had a PSA test with other physicians  - No concern about prostate cancer  - No family history of prostate cancer    PAST SURGICAL HISTORY:  TURP on 01/10/2023    FAMILY HISTORY  No family history of prostate cancer.       IPSS Questionnaire (AUA-7):  Incomplete emptying  Over the past month, how often have you had a sensation of not emptying your bladder completely after you finished urinating?: Less than 1 time in 5 (25 1103)  Frequency  Over the past month, how often have you had to urinate again less than two hours after you finishied urinating ?: More than half the time (25 1103)  Intermittency  Over the past  month, how often have you found you stopped and started again several time when you urinated ?: Not at all (07/02/25 1103)  Urgency  Over the last month, how often have you found it difficult  have you found it difficult to postpone urination ?: More than half the time (07/02/25 1103)  Weak Stream  Over the past month, how often have you had a weak urinary stream ?: Not at all (07/02/25 1103)  Straining  Over the past month, how often have you had to push or strain to begin urination ?: Not at all (07/02/25 1103)  Nocturia  Over the past month, how many times did you most typically get up to urinate from the time you went to bed until the time you got up in the morning ?: 2 times (07/02/25 1103)  Quality of life due to urinary symptoms  If you were to spend the rest of your life with your urinary condition the way it is now, how would feel about that?: Mixed - about equally satisfied (07/02/25 1103)    Scores  Total IPSS Score: (!) 11 (07/02/25 1103)  Total Score = Symptomatic Level: Moderately symptomatic: 8-19 (07/02/25 1103)        Subjective   Review of System:   As noted in HPI.    Past Medical History:   Diagnosis Date    Ankylosing spondylitis     Benign prostatic hyperplasia 2022    Elevated prostate specific antigen (PSA) 06/24/2024    Hyperlipidemia     Hypertension     Overactive bladder 07/02/2025    Urge incontinence of urine 07/02/2025    Urinary urgency 07/02/2025     Past Surgical History:   Procedure Laterality Date    COLONOSCOPY      CYSTOSCOPY TRANSURETHRAL RESECTION OF PROSTATE  01/10/2023    HERNIA REPAIR      NASAL SEPTAL RECONSTRUCTION      PROSTATE SURGERY       Family History   Problem Relation Age of Onset    Cancer Mother     Lung cancer Mother     Parkinsonism Father     Hypertension Other      Social History     Socioeconomic History    Marital status: Single   Tobacco Use    Smoking status: Never     Passive exposure: Never    Smokeless tobacco: Never   Vaping Use    Vaping status:  "Never Used   Substance and Sexual Activity    Alcohol use: No    Drug use: No    Sexual activity: Not Currently     Partners: Female       Current Outpatient Medications:     amLODIPine (NORVASC) 10 MG tablet, TAKE 1 TABLET BY MOUTH EVERY DAY, Disp: 90 tablet, Rfl: 3    ASPIRIN LOW DOSE 81 MG EC tablet, TAKE 1 TABLET BY MOUTH ONCE DAILY, Disp: 90 tablet, Rfl: 3    atorvastatin (LIPITOR) 10 MG tablet, TAKE 1 TABLET BY MOUTH EVERY DAY, Disp: 90 tablet, Rfl: 3    fluticasone (FLONASE) 50 MCG/ACT nasal spray, Administer 1 spray into the nostril(s) as directed by provider As Needed., Disp: , Rfl:     ketoconazole (NIZORAL) 2 % cream, APPLY TOPICALLY TO THE AFFECTED AREA OF FACE TWICE DAILY UNTIL GONE, Disp: , Rfl:     lisinopril (PRINIVIL,ZESTRIL) 10 MG tablet, Take 1 tablet by mouth Daily., Disp: 90 tablet, Rfl: 3    multivitamin (THERAGRAN) tablet tablet, Take  by mouth Daily., Disp: , Rfl:     naproxen (NAPROSYN) 500 MG tablet, TAKE 1 TABLET BY MOUTH TWICE DAILY WITH MEALS, Disp: 60 tablet, Rfl: 5    traZODone (DESYREL) 50 MG tablet, Take 1 tablet by mouth Every Night., Disp: 30 tablet, Rfl: 4    vitamin C (ASCORBIC ACID) 250 MG tablet, Take 1 tablet by mouth Daily., Disp: , Rfl:     Vibegron (Gemtesa) 75 MG tablet, Take 1 tablet by mouth Daily., Disp: 30 tablet, Rfl: 11    No Known Allergies  Objective   Visit Vitals  /82   Pulse 63   Temp 97.3 °F (36.3 °C) (Temporal)   Resp 14   Ht 189 cm (74.41\")   Wt 98.9 kg (218 lb)   SpO2 96%   BMI 27.68 kg/m²      Body mass index is 27.68 kg/m².     Physical Exam  Vitals and nursing note reviewed.   Constitutional:       General: He is awake. He is not in acute distress.     Appearance: He is not ill-appearing.   Pulmonary:      Effort: Pulmonary effort is normal.   Skin:     General: Skin is warm and dry.   Neurological:      Mental Status: He is alert and oriented to person, place, and time. Mental status is at baseline.   Psychiatric:         Mood and Affect: Mood " "normal.         Behavior: Behavior is cooperative.          Labs  Lab Results   Component Value Date    COLORU Orange (A) 02/14/2023    CLARITYU Cloudy (A) 02/14/2023    SPECGRAV 1.020 02/14/2023    PHUR 6.0 02/14/2023    LEUKOCYTESUR Large (3+) (A) 02/14/2023    NITRITE Negative 02/14/2023    PROTEINPOCUA 3+ (A) 02/14/2023    GLUCOSEUR Negative 02/14/2023    KETONESU Negative 02/14/2023    UROBILINOGEN Normal 02/14/2023    BILIRUBINUR Negative 02/14/2023    RBCUR 3+ (A) 02/14/2023      Lab Results   Component Value Date    RBCUA 0-2 11/17/2022    BACTERIA Comment 11/17/2022        Lab Results   Component Value Date    WBC 9.66 07/01/2025    HGB 15.7 07/01/2025    HCT 46.5 07/01/2025    MCV 86.9 07/01/2025     07/01/2025     Lab Results   Component Value Date    GLUCOSE 101 (H) 07/01/2025    CALCIUM 8.8 07/01/2025     07/01/2025    K 4.5 07/01/2025    CO2 25.0 07/01/2025     07/01/2025    BUN 16.0 07/01/2025    BUN 17 06/20/2024    CREATININE 1.14 07/01/2025    CREATININE 0.98 06/20/2024    EGFR 69.2 07/01/2025    EGFR 83.5 06/20/2024    BCR 14.0 07/01/2025    ANIONGAP 17.0 (H) 11/30/2016    ALT 32 07/01/2025    AST 23 07/01/2025       Lab Results   Component Value Date    HGBA1C 5.40 07/01/2025        Lab Results   Component Value Date    PSA 3.940 06/20/2024    PSA 4.010 (H) 11/16/2023    PSA 4.740 (H) 06/22/2023       No results found for: \"URICACIDSTN\", \"NBMU9XKELBC\", \"VMIA7SRNEP\", \"LABMAGN\", \"CAPHOSSTONE\", \"HYDROXYAPATI\"  Lab Results   Component Value Date    FLHU30SI 29.3 (L) 07/01/2025    URICACID 6.3 12/06/2023     Lab Results   Component Value Date    LABPH 6.0 11/17/2022       Lab Results   Component Value Date    HCT 46.5 07/01/2025    HCT 44.2 06/20/2024    HCT 45.8 11/16/2023    HCT 46.2 11/15/2022     Lab Results   Component Value Date    HGB 15.7 07/01/2025    HGB 14.5 06/20/2024    HGB 15.7 11/16/2023    HGB 15.2 11/15/2022     No results found for: \"TESTOSTERONE\"    No results " "found for: \"TESTOSTEROTT\"    No results found for: \"ESTRADIOL\", \"LH\", \"PROLACTIN\", \"FSH\"      I have reviewed the above labs.      PVR  Post-void residual performed with ultrasound by staff and interpreted by me - 0 mL    Assessment / Plan      Diagnoses and all orders for this visit:    1. BPH without urinary obstruction (Primary)  -     Cancel: POC Urinalysis Dipstick, Automated  -     Cancel: PSA Diagnostic  -     PSA Diagnostic; Future    2. Elevated prostate specific antigen (PSA)  -     Cancel: PSA Diagnostic  -     PSA Diagnostic; Future    3. Urinary urgency  -     Vibegron (Gemtesa) 75 MG tablet; Take 1 tablet by mouth Daily.  Dispense: 30 tablet; Refill: 11    4. Overactive bladder  -     Vibegron (Gemtesa) 75 MG tablet; Take 1 tablet by mouth Daily.  Dispense: 30 tablet; Refill: 11    5. Urge incontinence of urine  -     Vibegron (Gemtesa) 75 MG tablet; Take 1 tablet by mouth Daily.  Dispense: 30 tablet; Refill: 11         Assessment & Plan  Benign Prostatic Hyperplasia (BPH)  - IPSS score increased from 7 to 11, indicating worsening symptoms  - Reports nocturia twice a night and occasional urgency  - PVR was zero, indicating complete bladder emptying  - Prostate tissue from TURP was negative for malignancy  - 4K score was low, suggesting low likelihood of aggressive prostate cancer  - Order PSA test today  - Advise avoiding bladder irritants like caffeine  - Provide Gemtesa prescription and 6-week sample supply  - Potential Gemtesa side effects   - Unable to take myrbetriq due to HTN  - Does not want to take anticholinergics due to side effect profile  - Discuss further if PSA results are elevated  - Continue Gemtesa if effective; discontinue if not  - If patient discontinues gemtesa, he wishes to follow up PRN  - Recommend annual PSA    Follow-up  - Scheduled for 6 weeks, PVR for gemtesa follow up/PSA prior       Return in about 6 weeks (around 8/13/2025) for f/u with Elizabeth, PVR.    Elizabeth Ortega, " MSN, APRN, FNP-C  [facility]

## 2025-07-06 DIAGNOSIS — E78.00 HYPERCHOLESTEROLEMIA: ICD-10-CM

## 2025-07-07 RX ORDER — ATORVASTATIN CALCIUM 10 MG/1
10 TABLET, FILM COATED ORAL DAILY
Qty: 90 TABLET | Refills: 3 | Status: SHIPPED | OUTPATIENT
Start: 2025-07-07

## 2025-07-11 ENCOUNTER — TELEPHONE (OUTPATIENT)
Dept: UROLOGY | Facility: CLINIC | Age: 70
End: 2025-07-11
Payer: MEDICARE

## 2025-07-11 DIAGNOSIS — R97.20 ELEVATED PROSTATE SPECIFIC ANTIGEN (PSA): Primary | ICD-10-CM

## 2025-07-11 NOTE — TELEPHONE ENCOUNTER
Called to inform PT of Elizabeth's message, see previous patient message. PT verbalized agreement to an MRI of the prostate and will call for follow up with RAYMOND Castaneda. PT expressed understanding and had no further questions.    GENARO Burciaga MA

## 2025-08-01 ENCOUNTER — HOSPITAL ENCOUNTER (OUTPATIENT)
Facility: HOSPITAL | Age: 70
Discharge: HOME | End: 2025-08-01
Payer: MEDICARE

## 2025-08-01 DIAGNOSIS — R97.20 ELEVATED PROSTATE SPECIFIC ANTIGEN (PSA): ICD-10-CM

## 2025-08-01 PROCEDURE — 72197 MRI PELVIS W/O & W/DYE: CPT

## 2025-08-01 PROCEDURE — 25510000002 GADOBENATE DIMEGLUMINE 529 MG/ML SOLUTION: Performed by: NURSE PRACTITIONER

## 2025-08-01 PROCEDURE — A9577 INJ MULTIHANCE: HCPCS | Performed by: NURSE PRACTITIONER

## 2025-08-01 RX ADMIN — GADOBENATE DIMEGLUMINE 20 ML: 529 INJECTION, SOLUTION INTRAVENOUS at 10:30

## 2025-08-14 ENCOUNTER — OFFICE VISIT (OUTPATIENT)
Dept: INTERNAL MEDICINE | Facility: CLINIC | Age: 70
End: 2025-08-14
Payer: MEDICARE

## 2025-08-14 VITALS
OXYGEN SATURATION: 97 % | HEIGHT: 74 IN | TEMPERATURE: 97.8 F | BODY MASS INDEX: 28.11 KG/M2 | WEIGHT: 219 LBS | SYSTOLIC BLOOD PRESSURE: 140 MMHG | HEART RATE: 74 BPM | DIASTOLIC BLOOD PRESSURE: 80 MMHG

## 2025-08-14 DIAGNOSIS — I10 ESSENTIAL HYPERTENSION: ICD-10-CM

## 2025-08-14 DIAGNOSIS — R39.89 ABNORMAL PROSTATE EXAM: Primary | ICD-10-CM

## 2025-08-14 RX ORDER — LISINOPRIL 20 MG/1
20 TABLET ORAL DAILY
Qty: 90 TABLET | Refills: 3 | Status: SHIPPED | OUTPATIENT
Start: 2025-08-14